# Patient Record
Sex: MALE | Race: BLACK OR AFRICAN AMERICAN | Employment: OTHER | URBAN - METROPOLITAN AREA
[De-identification: names, ages, dates, MRNs, and addresses within clinical notes are randomized per-mention and may not be internally consistent; named-entity substitution may affect disease eponyms.]

---

## 2016-11-03 LAB — PSA, EXTERNAL: 1.49

## 2017-01-12 ENCOUNTER — OP HISTORICAL/CONVERTED ENCOUNTER (OUTPATIENT)
Dept: OTHER | Age: 72
End: 2017-01-12

## 2017-01-26 ENCOUNTER — OP HISTORICAL/CONVERTED ENCOUNTER (OUTPATIENT)
Dept: OTHER | Age: 72
End: 2017-01-26

## 2017-02-02 ENCOUNTER — OP HISTORICAL/CONVERTED ENCOUNTER (OUTPATIENT)
Dept: OTHER | Age: 72
End: 2017-02-02

## 2017-02-09 ENCOUNTER — OP HISTORICAL/CONVERTED ENCOUNTER (OUTPATIENT)
Dept: OTHER | Age: 72
End: 2017-02-09

## 2017-02-16 ENCOUNTER — OP HISTORICAL/CONVERTED ENCOUNTER (OUTPATIENT)
Dept: OTHER | Age: 72
End: 2017-02-16

## 2017-02-23 ENCOUNTER — OP HISTORICAL/CONVERTED ENCOUNTER (OUTPATIENT)
Dept: OTHER | Age: 72
End: 2017-02-23

## 2017-03-02 ENCOUNTER — OP HISTORICAL/CONVERTED ENCOUNTER (OUTPATIENT)
Dept: OTHER | Age: 72
End: 2017-03-02

## 2017-03-03 ENCOUNTER — OP HISTORICAL/CONVERTED ENCOUNTER (OUTPATIENT)
Dept: OTHER | Age: 72
End: 2017-03-03

## 2017-09-05 ENCOUNTER — OP HISTORICAL/CONVERTED ENCOUNTER (OUTPATIENT)
Dept: OTHER | Age: 72
End: 2017-09-05

## 2017-12-18 ENCOUNTER — OP HISTORICAL/CONVERTED ENCOUNTER (OUTPATIENT)
Dept: OTHER | Age: 72
End: 2017-12-18

## 2017-12-18 LAB — LDL-C, EXTERNAL: 137

## 2017-12-29 ENCOUNTER — ED HISTORICAL/CONVERTED ENCOUNTER (OUTPATIENT)
Dept: OTHER | Age: 72
End: 2017-12-29

## 2018-07-11 ENCOUNTER — ED HISTORICAL/CONVERTED ENCOUNTER (OUTPATIENT)
Dept: OTHER | Age: 73
End: 2018-07-11

## 2018-10-22 ENCOUNTER — OP HISTORICAL/CONVERTED ENCOUNTER (OUTPATIENT)
Dept: OTHER | Age: 73
End: 2018-10-22

## 2018-12-21 ENCOUNTER — OP HISTORICAL/CONVERTED ENCOUNTER (OUTPATIENT)
Dept: OTHER | Age: 73
End: 2018-12-21

## 2019-01-15 ENCOUNTER — ED HISTORICAL/CONVERTED ENCOUNTER (OUTPATIENT)
Dept: OTHER | Age: 74
End: 2019-01-15

## 2019-01-15 LAB
INR, EXTERNAL: 0.9
MICROALBUMIN UR TEST STR-MCNC: 100 MG/DL
PT, EXTERNAL: 9.5

## 2019-03-14 LAB — CREATININE, EXTERNAL: 1.28

## 2019-04-21 ENCOUNTER — ED HISTORICAL/CONVERTED ENCOUNTER (OUTPATIENT)
Dept: OTHER | Age: 74
End: 2019-04-21

## 2020-07-29 RX ORDER — AMLODIPINE BESYLATE 10 MG/1
TABLET ORAL
Qty: 90 TAB | Refills: 0 | Status: SHIPPED | OUTPATIENT
Start: 2020-07-29 | End: 2021-09-24

## 2020-09-15 ENCOUNTER — TELEPHONE (OUTPATIENT)
Dept: FAMILY MEDICINE CLINIC | Age: 75
End: 2020-09-15

## 2020-09-15 DIAGNOSIS — J45.30 MILD PERSISTENT ASTHMA WITHOUT COMPLICATION: Primary | ICD-10-CM

## 2020-09-15 RX ORDER — ALBUTEROL SULFATE 90 UG/1
2 AEROSOL, METERED RESPIRATORY (INHALATION)
Qty: 1 INHALER | Refills: 0 | Status: SHIPPED | OUTPATIENT
Start: 2020-09-15 | End: 2020-10-13

## 2020-10-08 ENCOUNTER — OFFICE VISIT (OUTPATIENT)
Dept: FAMILY MEDICINE CLINIC | Age: 75
End: 2020-10-08
Payer: MEDICARE

## 2020-10-08 VITALS
HEART RATE: 78 BPM | BODY MASS INDEX: 31.57 KG/M2 | TEMPERATURE: 97.1 F | RESPIRATION RATE: 18 BRPM | WEIGHT: 246 LBS | DIASTOLIC BLOOD PRESSURE: 90 MMHG | OXYGEN SATURATION: 98 % | SYSTOLIC BLOOD PRESSURE: 140 MMHG | HEIGHT: 74 IN

## 2020-10-08 DIAGNOSIS — Z71.6 TOBACCO ABUSE COUNSELING: ICD-10-CM

## 2020-10-08 DIAGNOSIS — M10.9 ACUTE GOUTY ARTHRITIS: Primary | ICD-10-CM

## 2020-10-08 DIAGNOSIS — I11.9 MALIGNANT HYPERTENSIVE HEART DISEASE WITHOUT HEART FAILURE: ICD-10-CM

## 2020-10-08 DIAGNOSIS — E78.2 MIXED HYPERLIPIDEMIA: ICD-10-CM

## 2020-10-08 DIAGNOSIS — I73.9 PVD (PERIPHERAL VASCULAR DISEASE) (HCC): ICD-10-CM

## 2020-10-08 DIAGNOSIS — F17.200 TOBACCO DEPENDENCE SYNDROME: ICD-10-CM

## 2020-10-08 PROCEDURE — 99214 OFFICE O/P EST MOD 30 MIN: CPT | Performed by: FAMILY MEDICINE

## 2020-10-08 RX ORDER — ALLOPURINOL 300 MG/1
300 TABLET ORAL DAILY
Qty: 90 TAB | Refills: 0 | Status: SHIPPED | OUTPATIENT
Start: 2020-10-08 | End: 2021-01-06

## 2020-10-08 RX ORDER — COLCHICINE 0.6 MG/1
0.6 TABLET ORAL 2 TIMES DAILY
Qty: 30 TAB | Refills: 0 | Status: SHIPPED | OUTPATIENT
Start: 2020-10-08 | End: 2021-03-22 | Stop reason: SDUPTHER

## 2020-10-08 RX ORDER — IBUPROFEN 600 MG/1
600 TABLET ORAL
Qty: 30 TAB | Refills: 0 | Status: SHIPPED | OUTPATIENT
Start: 2020-10-08 | End: 2021-04-07 | Stop reason: ALTCHOICE

## 2020-10-08 NOTE — PROGRESS NOTES
Shannon Campbell is a 77 y.o. male and presents with Follow Up Chronic Condition; Hypertension; and Medication Refill  . HPI   Patient with a hx of HTN c/o severe right knee pain after drinking 4 shots of whiskey with his dinner     Subjective:  Cardiovascular Review:  The patient has hypertension   Diet and Lifestyle: generally follows a low fat low cholesterol diet, generally follows a low sodium diet, exercises sporadically  Home BP Monitoring: is not measured at home. Pertinent ROS: taking medications as instructed, no medication side effects noted, no TIA's, no chest pain on exertion, no dyspnea on exertion, no swelling of ankles. Review of Systems  Review of Systems   Constitutional: Negative. Negative for chills and fever. HENT: Negative. Negative for congestion, ear discharge, hearing loss, nosebleeds and tinnitus. Eyes: Negative. Negative for blurred vision, double vision, photophobia and pain. Respiratory: Negative. Negative for cough, hemoptysis and sputum production. Cardiovascular: Negative. Negative for chest pain and palpitations. Gastrointestinal: Negative. Negative for heartburn, nausea and vomiting. Genitourinary: Negative. Negative for dysuria, frequency and urgency. Musculoskeletal: Positive for joint pain. Negative for back pain and myalgias. Skin: Negative. Neurological: Negative. Negative for dizziness, tingling, weakness and headaches. Endo/Heme/Allergies: Negative. Psychiatric/Behavioral: Negative. Negative for depression and suicidal ideas. The patient does not have insomnia. All other systems reviewed and are negative.         Past Medical History:   Diagnosis Date    Diabetes [facility])     pt denies he has diabetes    Gastrointestinal disorder     COLITIS    GERD (gastroesophageal reflux disease)     Gout     Gout 2012    right foot    Hypertension     Ill-defined condition     gout    Other ill-defined conditions(597.43)     pherpherial vascular disease, foot ulcer     Past Surgical History:   Procedure Laterality Date    HX HEENT Right 2001    cataract removal    HX ORTHOPAEDIC Right 7/24/15    excision part of second toe    HX OTHER SURGICAL      Toe Nail Removed Right Foot    VASCULAR SURGERY PROCEDURE UNLIST Right 4/24/2015    RIGHT POPLITEAL-TIBIAL BYPASS - by Dr Marcelino Howell     Social History     Socioeconomic History    Marital status:      Spouse name: Not on file    Number of children: Not on file    Years of education: Not on file    Highest education level: Not on file   Tobacco Use    Smoking status: Current Every Day Smoker     Packs/day: 0.75     Years: 50.00     Pack years: 37.50    Smokeless tobacco: Never Used    Tobacco comment: pateint states he is trying to quit   Substance and Sexual Activity    Alcohol use: Yes     Alcohol/week: 5.0 standard drinks     Types: 6 Cans of beer per week     Comment: \" a few drinks a week\"    Drug use: No     No family history on file. Current Outpatient Medications   Medication Sig Dispense Refill    albuterol (PROVENTIL HFA, VENTOLIN HFA, PROAIR HFA) 90 mcg/actuation inhaler Take 2 Puffs by inhalation every six (6) hours as needed for Wheezing. 1 Inhaler 0    amLODIPine (NORVASC) 10 mg tablet Take 1 tablet by mouth once daily 90 Tab 0    oxyCODONE-acetaminophen (PERCOCET) 5-325 mg per tablet Take 1 Tab by mouth every four (4) hours as needed for Pain. Max Daily Amount: 6 Tabs. 40 Tab 0    oxyCODONE-acetaminophen (PERCOCET) 5-325 mg per tablet Take 1 Tab by mouth every four (4) hours as needed for Pain.  Omega-3 Fatty Acids 300 mg cap Take  by mouth.  colchicine (COLCRYS) 0.6 mg tablet Take 0.6 mg by mouth two (2) times a day.  ALLOPURINOL PO Take 300 mg by mouth.  ibuprofen (MOTRIN) 200 mg tablet Take  by mouth every eight (8) hours as needed for Pain.  losartan (COZAAR) 100 mg tablet Take 100 mg by mouth daily.        Allergies   Allergen Reactions    Bactrim [Sulfamethoprim Ds] Itching    Sulfa (Sulfonamide Antibiotics) Rash     Pt reports recently started medication but got a rash       Objective:  Visit Vitals  BP (!) 140/90 (BP 1 Location: Left arm, BP Patient Position: Sitting)   Pulse 78   Temp 97.1 °F (36.2 °C) (Temporal)   Resp 18   Ht 6' 2\" (1.88 m)   Wt 246 lb (111.6 kg)   SpO2 98% Comment: room air   BMI 31.58 kg/m²       Physical Exam:   Physical Exam  Vitals signs and nursing note reviewed. Constitutional:       General: He is not in acute distress. Appearance: Normal appearance. He is obese. He is not ill-appearing, toxic-appearing or diaphoretic. HENT:      Head: Normocephalic and atraumatic. Right Ear: Tympanic membrane, ear canal and external ear normal. There is no impacted cerumen. Left Ear: Tympanic membrane, ear canal and external ear normal. There is no impacted cerumen. Nose: Nose normal. No congestion or rhinorrhea. Mouth/Throat:      Mouth: Mucous membranes are moist.      Pharynx: Oropharynx is clear. No oropharyngeal exudate or posterior oropharyngeal erythema. Eyes:      General: No scleral icterus. Right eye: No discharge. Left eye: No discharge. Extraocular Movements: Extraocular movements intact. Conjunctiva/sclera: Conjunctivae normal.      Pupils: Pupils are equal, round, and reactive to light. Neck:      Musculoskeletal: Normal range of motion and neck supple. No neck rigidity or muscular tenderness. Vascular: No carotid bruit. Cardiovascular:      Rate and Rhythm: Normal rate and regular rhythm. Pulses: Normal pulses. Heart sounds: Normal heart sounds. No murmur. No friction rub. No gallop. Pulmonary:      Effort: Pulmonary effort is normal. No respiratory distress. Breath sounds: Normal breath sounds. No stridor. No wheezing, rhonchi or rales. Chest:      Chest wall: No tenderness. Abdominal:      General: Abdomen is flat.  Bowel sounds are normal. There is no distension. Palpations: Abdomen is soft. There is no mass. Tenderness: There is no abdominal tenderness. There is no right CVA tenderness, left CVA tenderness, guarding or rebound. Hernia: No hernia is present. Musculoskeletal: Normal range of motion. General: No swelling, tenderness (right medial knee), deformity or signs of injury. Right lower leg: No edema. Left lower leg: No edema. Lymphadenopathy:      Cervical: No cervical adenopathy. Skin:     General: Skin is warm. Capillary Refill: Capillary refill takes 2 to 3 seconds. Coloration: Skin is not jaundiced or pale. Findings: No bruising, erythema, lesion or rash. Neurological:      General: No focal deficit present. Mental Status: He is alert and oriented to person, place, and time. Mental status is at baseline. Cranial Nerves: No cranial nerve deficit. Sensory: No sensory deficit. Motor: No weakness. Coordination: Coordination normal.      Gait: Gait normal.      Deep Tendon Reflexes: Reflexes normal.   Psychiatric:         Mood and Affect: Mood normal.         Behavior: Behavior normal.         Thought Content:  Thought content normal.         Judgment: Judgment normal.             Results for orders placed or performed during the hospital encounter of 07/29/15   CULTURE, WOUND W GRAM STAIN    Specimen: Foot    RIGHT   Result Value Ref Range    Special Requests: NO SPECIAL REQUESTS      GRAM STAIN NO ORGANISMS SEEN      Culture result: LIGHT  MORGANELLA MORGANII           Susceptibility    (morganella morganii) - RAJAT     Amikacin ($) <=16 Susceptible ug/mL     Ampicillin/sulbactam ($) 16/8 Intermediate ug/mL     Aztreonam ($$$$) <=4 Susceptible ug/mL     Ceftazidime ($) <=1 Susceptible ug/mL     Ceftriaxone ($) <=1 Susceptible ug/mL     Cefotaxime <=2 Susceptible ug/mL     Cefepime ($$) <=4 Susceptible ug/mL     Ciprofloxacin ($) <=1 Susceptible ug/mL Gentamicin ($) <=4 Susceptible ug/mL     Levofloxacin ($) <=2 Susceptible ug/mL     Meropenem ($$) <=1 Susceptible ug/mL     Piperacillin/Tazobac ($) <=16 Susceptible ug/mL     Tobramycin ($) <=4 Susceptible ug/mL     Trimeth-Sulfamethoxa <=2/38 Susceptible ug/mL   CULTURE, ANAEROBIC    Specimen: Wound    RIGHT   Result Value Ref Range    Special Requests: NO SPECIAL REQUESTS      Culture result: NO ANAEROBES ISOLATED     METABOLIC PANEL, BASIC   Result Value Ref Range    Sodium 138 136 - 145 mmol/L    Potassium 3.9 3.5 - 5.1 mmol/L    Chloride 103 97 - 108 mmol/L    CO2 26 21 - 32 mmol/L    Anion gap 9 5 - 15 mmol/L    Glucose 120 (H) 65 - 100 mg/dL    BUN 11 6 - 20 MG/DL    Creatinine 0.88 0.45 - 1.15 MG/DL    BUN/Creatinine ratio 13 12 - 20      GFR est AA >60 >60 ml/min/1.73m2    GFR est non-AA >60 >60 ml/min/1.73m2    Calcium 9.0 8.5 - 10.1 MG/DL   CBC WITH AUTOMATED DIFF   Result Value Ref Range    WBC 9.7 4.1 - 11.1 K/uL    RBC 3.73 (L) 4.10 - 5.70 M/uL    HGB 11.8 (L) 12.1 - 17.0 g/dL    HCT 37.3 36.6 - 50.3 %    .0 (H) 80.0 - 99.0 FL    MCH 31.6 26.0 - 34.0 PG    MCHC 31.6 30.0 - 36.5 g/dL    RDW 15.1 (H) 11.5 - 14.5 %    PLATELET 806 168 - 844 K/uL    NEUTROPHILS 70 32 - 75 %    LYMPHOCYTES 15 12 - 49 %    MONOCYTES 11 5 - 13 %    EOSINOPHILS 4 0 - 7 %    BASOPHILS 0 0 - 1 %    ABS. NEUTROPHILS 6.8 1.8 - 8.0 K/UL    ABS. LYMPHOCYTES 1.5 0.8 - 3.5 K/UL    ABS. MONOCYTES 1.0 0.0 - 1.0 K/UL    ABS. EOSINOPHILS 0.4 0.0 - 0.4 K/UL    ABS.  BASOPHILS 0.0 0.0 - 0.1 K/UL   C REACTIVE PROTEIN, QT   Result Value Ref Range    C-Reactive protein 3.81 (H) <0.60 mg/dL   SED RATE (ESR)   Result Value Ref Range    Sed rate, automated 78 (H) 0 - 20 mm/hr   BUN   Result Value Ref Range    BUN 10 6 - 20 MG/DL   CREATININE   Result Value Ref Range    Creatinine 0.87 0.45 - 1.15 MG/DL    GFR est AA >60 >60 ml/min/1.73m2    GFR est non-AA >60 >60 ml/min/1.73m2   GLUCOSE, POC   Result Value Ref Range    Glucose (POC) 115 (H) 65 - 100 mg/dL    Performed by [facility]    GLUCOSE, POC   Result Value Ref Range    Glucose (POC) 108 (H) 65 - 100 mg/dL    Performed by Lorena Hernandez        Assessment/Plan:    ICD-10-CM ICD-9-CM    1. 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 Mixed hyperlipidemia  E78.2 272.2      No orders of the defined types were placed in this encounter. Cannot display discharge medications since this is not an admission.

## 2020-10-12 ENCOUNTER — TELEPHONE (OUTPATIENT)
Dept: FAMILY MEDICINE CLINIC | Age: 75
End: 2020-10-12

## 2020-10-13 ENCOUNTER — HOSPITAL ENCOUNTER (EMERGENCY)
Age: 75
Discharge: HOME | End: 2020-10-13
Attending: EMERGENCY MEDICINE
Payer: MEDICARE

## 2020-10-13 ENCOUNTER — APPOINTMENT (OUTPATIENT)
Dept: GENERAL RADIOLOGY | Age: 75
End: 2020-10-13
Attending: EMERGENCY MEDICINE
Payer: MEDICARE

## 2020-10-13 VITALS
OXYGEN SATURATION: 98 % | DIASTOLIC BLOOD PRESSURE: 94 MMHG | HEIGHT: 74 IN | SYSTOLIC BLOOD PRESSURE: 150 MMHG | WEIGHT: 245 LBS | BODY MASS INDEX: 31.44 KG/M2 | TEMPERATURE: 98.1 F | RESPIRATION RATE: 18 BRPM | HEART RATE: 99 BPM

## 2020-10-13 DIAGNOSIS — J06.9 UPPER RESPIRATORY TRACT INFECTION, UNSPECIFIED TYPE: Primary | ICD-10-CM

## 2020-10-13 DIAGNOSIS — Z20.822 SUSPECTED COVID-19 VIRUS INFECTION: ICD-10-CM

## 2020-10-13 LAB — SARS-COV-2, COV2: NORMAL

## 2020-10-13 PROCEDURE — 87635 SARS-COV-2 COVID-19 AMP PRB: CPT

## 2020-10-13 PROCEDURE — 71045 X-RAY EXAM CHEST 1 VIEW: CPT

## 2020-10-13 PROCEDURE — 99282 EMERGENCY DEPT VISIT SF MDM: CPT

## 2020-10-13 RX ORDER — ALBUTEROL SULFATE 90 UG/1
2 AEROSOL, METERED RESPIRATORY (INHALATION)
Qty: 9 INHALER | Refills: 0 | Status: SHIPPED | OUTPATIENT
Start: 2020-10-13 | End: 2020-11-19

## 2020-10-13 RX ORDER — DEXTROMETHORPHAN HYDROBROMIDE, GUAIFENESIN 20; 400 MG/20ML; MG/20ML
5 SOLUTION ORAL EVERY 6 HOURS
Qty: 200 ML | Refills: 0 | Status: SHIPPED | OUTPATIENT
Start: 2020-10-13 | End: 2021-04-07 | Stop reason: ALTCHOICE

## 2020-10-13 RX ORDER — AZITHROMYCIN 250 MG/1
TABLET, FILM COATED ORAL
Qty: 6 TAB | Refills: 0 | Status: SHIPPED | OUTPATIENT
Start: 2020-10-13 | End: 2021-04-07 | Stop reason: ALTCHOICE

## 2020-10-13 RX ORDER — ALBUTEROL SULFATE 90 UG/1
AEROSOL, METERED RESPIRATORY (INHALATION)
Qty: 9 G | Refills: 0 | Status: SHIPPED | OUTPATIENT
Start: 2020-10-13 | End: 2021-09-28 | Stop reason: SDUPTHER

## 2020-10-13 RX ORDER — ALBUTEROL SULFATE 90 UG/1
2 AEROSOL, METERED RESPIRATORY (INHALATION)
Qty: 1 INHALER | Refills: 0 | Status: SHIPPED | OUTPATIENT
Start: 2020-10-13 | End: 2021-04-07 | Stop reason: SDUPTHER

## 2020-10-13 NOTE — ED PROVIDER NOTES
EMERGENCY DEPARTMENT HISTORY AND PHYSICAL EXAM      Date: 10/13/2020  Patient Name: Femi Leo    History of Presenting Illness     Chief Complaint   Patient presents with    Cough       History Provided By: Patient    HPI: Femi Leo, 77 y.o. male with a past medical history significant hypertension and gout presents to the ED with chief complaint of Cough  . Patient is having cough congestion for the last 2 days. Initially it took his breath away but he feels better now. Shortness of breath is not related to exertion. Unsure about any fevers. Does feel body aches. No Covid exposure. No nausea vomiting diarrhea or constipation. No recent antibiotics. No headache ear pain or throat pain. There are no other complaints, changes, or physical findings at this time. PCP: Shavon Romo MD    Current Outpatient Medications   Medication Sig Dispense Refill    azithromycin (Zithromax Z-Michael) 250 mg tablet Take 2 tablet p.o. day 1 then 1 tablet p.o. day 2 through 5 6 Tab 0    albuterol (PROVENTIL HFA, VENTOLIN HFA, PROAIR HFA) 90 mcg/actuation inhaler Take 2 Puffs by inhalation every four (4) hours as needed for Wheezing. 1 Inhaler 0    dextromethorphan-guaiFENesin (Robitussin Cough-Chest Jaron DM) 5-100 mg/5 mL liqd Take 5 mL by mouth every six (6) hours. 200 mL 0    albuterol (PROVENTIL HFA, VENTOLIN HFA, PROAIR HFA) 90 mcg/actuation inhaler Take 2 Puffs by inhalation every six (6) hours as needed for Wheezing. 9 Inhaler 0    albuterol (PROVENTIL HFA, VENTOLIN HFA, PROAIR HFA) 90 mcg/actuation inhaler INHALE 2 PUFFS BY MOUTH EVERY 6 HOURS AS NEEDED FOR WHEEZING 9 g 0    allopurinoL (ZYLOPRIM) 300 mg tablet Take 1 Tab by mouth daily for 90 days. 90 Tab 0    colchicine (Colcrys) 0.6 mg tablet Take 1 Tab by mouth two (2) times a day. 30 Tab 0    ibuprofen (MOTRIN) 600 mg tablet Take 1 Tab by mouth every eight (8) hours as needed for Pain.  30 Tab 0    amLODIPine (NORVASC) 10 mg tablet Take 1 tablet by mouth once daily 90 Tab 0    oxyCODONE-acetaminophen (PERCOCET) 5-325 mg per tablet Take 1 Tab by mouth every four (4) hours as needed for Pain. Max Daily Amount: 6 Tabs. 40 Tab 0    oxyCODONE-acetaminophen (PERCOCET) 5-325 mg per tablet Take 1 Tab by mouth every four (4) hours as needed for Pain.  Omega-3 Fatty Acids 300 mg cap Take  by mouth.  ibuprofen (MOTRIN) 200 mg tablet Take  by mouth every eight (8) hours as needed for Pain.  losartan (COZAAR) 100 mg tablet Take 100 mg by mouth daily. Past History     Past Medical History:  Past Medical History:   Diagnosis Date    Diabetes ([facility] [address]     pt denies he has diabetes    Gastrointestinal disorder     COLITIS    GERD (gastroesophageal reflux disease)     Gout     Gout 2012    right foot    Hypertension     Ill-defined condition     gout    Other ill-defined conditions(799.89)     pherpherial vascular disease, foot ulcer       Past Surgical History:  Past Surgical History:   Procedure Laterality Date    HX HEENT Right 2001    cataract removal    HX ORTHOPAEDIC Right 7/24/15    excision part of second toe    HX OTHER SURGICAL      Toe Nail Removed Right Foot    VASCULAR SURGERY PROCEDURE UNLIST Right 4/24/2015    RIGHT POPLITEAL-TIBIAL BYPASS - by Dr Peder Holter       Family History:  No family history on file. Social History:  Social History     Tobacco Use    Smoking status: Current Every Day Smoker     Packs/day: 0.75     Years: 50.00     Pack years: 37.50    Smokeless tobacco: Never Used    Tobacco comment: pateint states he is trying to quit   Substance Use Topics    Alcohol use: Yes     Alcohol/week: 5.0 standard drinks     Types: 6 Cans of beer per week     Comment: \" a few drinks a week\"    Drug use: No       Allergies:   Allergies   Allergen Reactions    Bactrim [Sulfamethoprim Ds] Itching    Sulfa (Sulfonamide Antibiotics) Rash     Pt reports recently started medication but got a rash Review of Systems   Review of Systems   Constitutional: Positive for chills and fatigue. Negative for diaphoresis. HENT: Positive for congestion and sore throat. Negative for ear pain and nosebleeds. Eyes: Negative. Negative for pain, discharge and redness. Respiratory: Positive for cough and shortness of breath. Negative for chest tightness and wheezing. Cardiovascular: Negative. Negative for chest pain, palpitations and leg swelling. Gastrointestinal: Negative. Negative for abdominal pain, constipation, diarrhea, nausea and vomiting. Endocrine: Negative. Negative for cold intolerance. Genitourinary: Negative. Negative for difficulty urinating, dysuria and hematuria. Musculoskeletal: Negative. Negative for arthralgias, joint swelling and neck pain. Skin: Negative. Negative for color change, pallor, rash and wound. Allergic/Immunologic: Negative. Neurological: Negative. Negative for dizziness, syncope, weakness, light-headedness and headaches. Hematological: Negative. Does not bruise/bleed easily. Psychiatric/Behavioral: Negative. Negative for behavioral problems, confusion and suicidal ideas. All other systems reviewed and are negative. Physical Exam   Physical Exam  Vitals signs and nursing note reviewed. Constitutional:       General: He is not in acute distress. Appearance: He is normal weight. He is not ill-appearing. HENT:      Head: Normocephalic and atraumatic. Right Ear: External ear normal.      Left Ear: External ear normal.      Nose: Nose normal. No rhinorrhea. Mouth/Throat:      Mouth: Mucous membranes are moist.      Pharynx: Oropharynx is clear. Eyes:      Extraocular Movements: Extraocular movements intact. Conjunctiva/sclera: Conjunctivae normal.      Pupils: Pupils are equal, round, and reactive to light. Neck:      Musculoskeletal: Normal range of motion and neck supple.    Cardiovascular:      Rate and Rhythm: Normal rate and regular rhythm. Pulses: Normal pulses. Heart sounds: Normal heart sounds. Pulmonary:      Effort: Pulmonary effort is normal. No respiratory distress. Breath sounds: Normal breath sounds. Abdominal:      General: Abdomen is flat. Bowel sounds are normal.      Palpations: Abdomen is soft. Musculoskeletal: Normal range of motion. General: No tenderness or deformity. Skin:     General: Skin is warm and dry. Capillary Refill: Capillary refill takes less than 2 seconds. Findings: No bruising, lesion or rash. Neurological:      General: No focal deficit present. Mental Status: He is alert and oriented to person, place, and time. Mental status is at baseline. Psychiatric:         Mood and Affect: Mood normal.         Behavior: Behavior normal.         Thought Content: Thought content normal.         Judgment: Judgment normal.         Diagnostic Study Results     Labs -   No results found for this or any previous visit (from the past 12 hour(s)). Radiologic Studies -   XR CHEST SNGL V   Final Result        CT Results  (Last 48 hours)    None        CXR Results  (Last 48 hours)               10/13/20 1116  XR CHEST SNGL V Final result    Narrative:  Chest single view. A single view of the chest is obtained. Lung volumes are within normal limits. The peripheral lungs are clear. Cardiac and mediastinal structures are within   normal limits. There is no pneumothorax or pleural effusion. Medical Decision Making and ED Course   I am the first provider for this patient. I reviewed the vital signs, available nursing notes, past medical history, past surgical history, family history and social history. Vital Signs-Reviewed the patient's vital signs. Patient Vitals for the past 12 hrs:   Temp Pulse Resp BP SpO2   10/13/20 1107 98.1 °F (36.7 °C) 99 18 (!) 150/94 98 %       EKG interpretation:         Records Reviewed: Previous Hospital chart. EMS run report      ED Course:   Initial assessment performed. The patients presenting problems have been discussed, and they are in agreement with the care plan formulated and outlined with them. I have encouraged them to ask questions as they arise throughout their visit. Orders Placed This Encounter    XR CHEST SNGL V     Standing Status:   Standing     Number of Occurrences:   1     Order Specific Question:   Transport     Answer:   Stretcher [5]     Order Specific Question:   Reason for Exam     Answer:   cough    SARS-COV-2     Standing Status:   Standing     Number of Occurrences:   1     Order Specific Question:   Specimen source     Answer:   Nasopharyngeal [[id number]]     Order Specific Question:   Is this test for diagnosis or screening? Answer:   Screening     Order Specific Question:   Symptomatic for COVID-19 as defined by CDC? Answer:   No     Order Specific Question:   Hospitalized for COVID-19? Answer:   No     Order Specific Question:   Admitted to ICU for COVID-19? Answer:   No     Order Specific Question:   Employed in healthcare setting? Answer:   Unknown     Order Specific Question:   Resident in a congregate (group) care setting? Answer:   Unknown     Order Specific Question:   Previously tested for COVID-19? Answer:   Unknown    azithromycin (Zithromax Z-Michael) 250 mg tablet     Sig: Take 2 tablet p.o. day 1 then 1 tablet p.o. day 2 through 5     Dispense:  6 Tab     Refill:  0    albuterol (PROVENTIL HFA, VENTOLIN HFA, PROAIR HFA) 90 mcg/actuation inhaler     Sig: Take 2 Puffs by inhalation every four (4) hours as needed for Wheezing. Dispense:  1 Inhaler     Refill:  0    dextromethorphan-guaiFENesin (Robitussin Cough-Chest Jaron DM) 5-100 mg/5 mL liqd     Sig: Take 5 mL by mouth every six (6) hours.      Dispense:  200 mL     Refill:  0              CONSULTANTS:      Provider Notes (Medical Decision Making):   71-year-old male with cough congestion with a negative chest x-ray stable vitals including O2 sats in no respiratory distress. Plan to check for Covid. Symptomatic treatment at discharge with antibiotic. Procedures                       Disposition       Emergency Department Disposition:  Discharged      DISCHARGE PLAN:    Patient is discharged home. Discharge instructions provided. Patient is stable and improved at time of disposition. Vitals are stable. 1.   Current Discharge Medication List      CONTINUE these medications which have NOT CHANGED    Details   !! albuterol (PROVENTIL HFA, VENTOLIN HFA, PROAIR HFA) 90 mcg/actuation inhaler Take 2 Puffs by inhalation every six (6) hours as needed for Wheezing. Qty: 9 Inhaler, Refills: 0      !! albuterol (PROVENTIL HFA, VENTOLIN HFA, PROAIR HFA) 90 mcg/actuation inhaler INHALE 2 PUFFS BY MOUTH EVERY 6 HOURS AS NEEDED FOR WHEEZING  Qty: 9 g, Refills: 0      allopurinoL (ZYLOPRIM) 300 mg tablet Take 1 Tab by mouth daily for 90 days. Qty: 90 Tab, Refills: 0      colchicine (Colcrys) 0.6 mg tablet Take 1 Tab by mouth two (2) times a day. Qty: 30 Tab, Refills: 0      !! ibuprofen (MOTRIN) 600 mg tablet Take 1 Tab by mouth every eight (8) hours as needed for Pain. Qty: 30 Tab, Refills: 0      amLODIPine (NORVASC) 10 mg tablet Take 1 tablet by mouth once daily  Qty: 90 Tab, Refills: 0      !! oxyCODONE-acetaminophen (PERCOCET) 5-325 mg per tablet Take 1 Tab by mouth every four (4) hours as needed for Pain. Max Daily Amount: 6 Tabs. Qty: 40 Tab, Refills: 0      !! oxyCODONE-acetaminophen (PERCOCET) 5-325 mg per tablet Take 1 Tab by mouth every four (4) hours as needed for Pain. Omega-3 Fatty Acids 300 mg cap Take  by mouth. !! ibuprofen (MOTRIN) 200 mg tablet Take  by mouth every eight (8) hours as needed for Pain.      losartan (COZAAR) 100 mg tablet Take 100 mg by mouth daily. !! - Potential duplicate medications found. Please discuss with provider.         2.   Follow-up Information Follow up With Specialties Details Why Contact Tomy Xavier MD [facility] Schedule an appointment as soon as possible for a visit  Sharon Currie 7 [phone]          3. Return to ED if worse     Pt voiced they understand they plan and do not have questions at this time      Diagnosis     Clinical Impression:   1. Upper respiratory tract infection, unspecified type    2. Suspected COVID-19 virus infection        Attestations:    Suellen Cohen MD    Please note that this dictation was completed with DocDep, the computer voice recognition software. Quite often unanticipated grammatical, syntax, homophones, and other interpretive errors are inadvertently transcribed by the computer software. Please disregard these errors. Please excuse any errors that have escaped final proofreading. Thank you.

## 2020-10-13 NOTE — DISCHARGE INSTRUCTIONS
Patient Education        Learning About Coronavirus [phone])  Coronavirus [phone]): Overview  What is coronavirus ([id number])? The coronavirus disease (COVID-19) is caused by a virus. It is an illness that was first found in December 2019. It has since spread worldwide. The virus can cause fever, cough, and trouble breathing. In severe cases, it can cause pneumonia and make it hard to breathe without help. It can cause death. This virus spreads person-to-person through droplets from coughing and sneezing. It can also spread when you are close to someone who is infected. And it can spread when you touch something that has the virus on it, such as a doorknob or a tabletop. Coronaviruses are a large group of viruses. They cause the common cold. They also cause more serious illnesses like [place] respiratory syndrome (MERS) and severe acute respiratory syndrome (SARS). COVID-19 is caused by a novel coronavirus. That means it's a new type that has not been seen in people before. How is COVID-19 treated? Mild illness can be treated at home, but more serious illness needs to be treated in the hospital. Treatment may include medicines to reduce symptoms, plus breathing support such as oxygen therapy or a ventilator. Other treatments, such as antiviral medicines, may help people who have COVID-19. What can you do to protect yourself from COVID-19? The best way to protect yourself from getting sick is to:  · Avoid areas where there is an outbreak. · Avoid contact with people who may be infected. · Avoid crowds and try to stay at least 6 feet away from other people. · Wash your hands often, especially after you cough or sneeze. Use soap and water, and scrub for at least 20 seconds. If soap and water aren't available, use an alcohol-based hand . · Avoid touching your mouth, nose, and eyes. What can you do to avoid spreading the virus to others?   To help avoid spreading the virus to others:  · [employer] your hands often with soap or alcohol-based hand sanitizers. · Cover your mouth with a tissue when you cough or sneeze. Then throw the tissue in the trash. · Use a disinfectant to clean things that you touch often. These include doorknobs, remote controls, phones, and handles on your refrigerator and microwave. And don't forget countertops, tabletops, bathrooms, and computer keyboards. · Wear a cloth face cover if you have to go to public areas. If you know or suspect that you have COVID-19:  · Stay home. Don't go to school, work, or public areas. And don't use public transportation, ride-shares, or taxis unless you have no choice. · Leave your home only if you need to get medical care or testing. But call the doctor's office first so they know you're coming. And wear a face cover. · Limit contact with people in your home. If possible, stay in a separate bedroom and use a separate bathroom. · Wear a face cover whenever you're around other people. It can help stop the spread of the virus when you cough or sneeze. · Clean and disinfect your home every day. Use household  and disinfectant wipes or sprays. Take special care to clean things that you grab with your hands. · Self-isolate until it's safe to be around others again. ? If you have symptoms, it's safe when you haven't had a fever for 3 days and your symptoms have improved and it's been at least 10 days since your symptoms started. ? If you were exposed to the virus but don't have symptoms, it's safe to be around others 14 days after exposure. ? Talk to your doctor about whether you also need testing, especially if you have a weakened immune system. When to call for help  Call 911 anytime you think you may need emergency care. For example, call if:  · You have severe trouble breathing. (You can't talk at all.)  · You have constant chest pain or pressure. · You are severely dizzy or lightheaded.   · You are confused or can't think clearly. · Your face and lips have a blue color. · You passed out (lost consciousness) or are very hard to wake up. Call your doctor now if you develop symptoms such as:  · Shortness of breath. · Fever. · Cough. If you need to get care, call ahead to the doctor's office for instructions before you go. Make sure you wear a face cover to prevent exposing other people to the virus. Where can you get the latest information? The following health organizations are tracking and studying this virus. Their websites contain the most up-to-date information. Seema Gibson also learn what to do if you think you may have been exposed to the virus. · U.S. Centers for Disease Control and Prevention (CDC): The CDC provides updated news about the disease and travel advice. The website also tells you how to prevent the spread of infection. www.cdc.gov  · World Health Organization [facility]): WHO offers information about the virus outbreaks. WHO also has travel advice. www.who.int  Current as of: July 10, 2020               Content Version: 12.6  © 2006-2020 [employer]. Care instructions adapted under license by [employer] (which disclaims liability or warranty for this information). If you have questions about a medical condition or this instruction, always ask your healthcare professional. [address] any warranty or liability for your use of this information.

## 2020-10-15 ENCOUNTER — TELEPHONE (OUTPATIENT)
Dept: FAMILY MEDICINE CLINIC | Age: 75
End: 2020-10-15

## 2020-10-15 LAB — SARS-COV-2, COV2NT: NOT DETECTED

## 2020-10-15 NOTE — TELEPHONE ENCOUNTER
Patient called and said one of the medications prescribed to him from the hospital has a reaction with one the the medications you currently prescribe to him he wants to know if you have reviewed his er visit and if the medications can be taken at different times or not at all

## 2020-10-15 NOTE — PROGRESS NOTES
Patient called requesting COVID results, he was notified of negative test result, he verbalizes understanding

## 2020-10-21 ENCOUNTER — OFFICE VISIT (OUTPATIENT)
Dept: FAMILY MEDICINE CLINIC | Age: 75
End: 2020-10-21
Payer: MEDICARE

## 2020-10-21 VITALS
SYSTOLIC BLOOD PRESSURE: 130 MMHG | TEMPERATURE: 97.3 F | HEART RATE: 76 BPM | BODY MASS INDEX: 31.84 KG/M2 | RESPIRATION RATE: 18 BRPM | WEIGHT: 248 LBS | OXYGEN SATURATION: 98 % | DIASTOLIC BLOOD PRESSURE: 70 MMHG

## 2020-10-21 DIAGNOSIS — J45.40 MODERATE PERSISTENT ASTHMA WITHOUT COMPLICATION: ICD-10-CM

## 2020-10-21 DIAGNOSIS — R05.9 COUGH: Primary | ICD-10-CM

## 2020-10-21 PROCEDURE — 99213 OFFICE O/P EST LOW 20 MIN: CPT | Performed by: FAMILY MEDICINE

## 2020-10-21 NOTE — PROGRESS NOTES
HPI    Andreina Sauceda is a 77 y.o. male and presents today for Hospital Follow Up (cough)  . HPI   78 yo male with a hx of asthma and COPD and currently smoking a pack of cigarettes a day, s/p hospital ER visit for coughing with normal chest xray  Allergies    Allergies   Allergen Reactions    Bactrim [Sulfamethoprim Ds] Itching    Sulfa (Sulfonamide Antibiotics) Rash     Pt reports recently started medication but got a rash        Medications    Current Outpatient Medications   Medication Sig Dispense    albuterol (PROVENTIL HFA, VENTOLIN HFA, PROAIR HFA) 90 mcg/actuation inhaler Take 2 Puffs by inhalation every six (6) hours as needed for Wheezing. 9 Inhaler    albuterol (PROVENTIL HFA, VENTOLIN HFA, PROAIR HFA) 90 mcg/actuation inhaler INHALE 2 PUFFS BY MOUTH EVERY 6 HOURS AS NEEDED FOR WHEEZING 9 g    azithromycin (Zithromax Z-Michael) 250 mg tablet Take 2 tablet p.o. day 1 then 1 tablet p.o. day 2 through 5 6 Tab    albuterol (PROVENTIL HFA, VENTOLIN HFA, PROAIR HFA) 90 mcg/actuation inhaler Take 2 Puffs by inhalation every four (4) hours as needed for Wheezing. 1 Inhaler    dextromethorphan-guaiFENesin (Robitussin Cough-Chest Jaron DM) 5-100 mg/5 mL liqd Take 5 mL by mouth every six (6) hours. 200 mL    allopurinoL (ZYLOPRIM) 300 mg tablet Take 1 Tab by mouth daily for 90 days. 90 Tab    colchicine (Colcrys) 0.6 mg tablet Take 1 Tab by mouth two (2) times a day. 30 Tab    ibuprofen (MOTRIN) 600 mg tablet Take 1 Tab by mouth every eight (8) hours as needed for Pain. 30 Tab    amLODIPine (NORVASC) 10 mg tablet Take 1 tablet by mouth once daily 90 Tab    oxyCODONE-acetaminophen (PERCOCET) 5-325 mg per tablet Take 1 Tab by mouth every four (4) hours as needed for Pain. Max Daily Amount: 6 Tabs. 40 Tab    oxyCODONE-acetaminophen (PERCOCET) 5-325 mg per tablet Take 1 Tab by mouth every four (4) hours as needed for Pain.  Omega-3 Fatty Acids 300 mg cap Take  by mouth.      ibuprofen (MOTRIN) 200 mg tablet Take  by mouth every eight (8) hours as needed for Pain.  losartan (COZAAR) 100 mg tablet Take 100 mg by mouth daily. No current facility-administered medications for this visit. Health Maintenance    Health Maintenance Due   Topic Date Due    Hepatitis C Screening  1945    DTaP/Tdap/Td series (1 - Tdap) 08/19/1967    Lipid Screen  08/19/1986    Shingrix Vaccine Age 50> (1 of 2) 08/19/1996    Colorectal Cancer Screening Combo  08/19/1996    GLAUCOMA SCREENING Q2Y  08/19/2011    AAA Screening 73-67 YO Male Smoking Patients  08/19/2011    Pneumococcal 65+ years (1 of 1 - PPSV23) 08/19/2011    Medicare Yearly Exam  02/26/2020    Flu Vaccine (1) 09/01/2020        Problem List    Patient Active Problem List    Diagnosis Date Noted    Acute gouty arthritis 10/08/2020    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 Social Hx    Social History     Socioeconomic History    Marital status:      Spouse name: Not on file    Number of children: Not on file    Years of education: Not on file    Highest education level: Not on file   Tobacco Use    Smoking status: Current Every Day Smoker     Packs/day: 0.75     Years: 50.00     Pack years: 45.53    Smokeless tobacco: Never Used    Tobacco comment: pateint states he is trying to quit   Substance and Sexual Activity    Alcohol use:  Yes     Alcohol/week: 5.0 standard drinks     Types: 6 Cans of beer per week     Comment: \" a few drinks a week\"    Drug use: No        Surgical Hx    Past Surgical History:   Procedure Laterality Date    HX HEENT Right 2001    cataract removal    HX ORTHOPAEDIC Right 7/24/15    excision part of second toe    HX OTHER SURGICAL      Toe Nail Removed Right Foot    VASCULAR SURGERY PROCEDURE UNLIST Right 4/24/2015    RIGHT POPLITEAL-TIBIAL BYPASS - by Dr Abhay Bronson  /70 (BP 1 Location: Left arm, BP Patient Position: Sitting)   Pulse 76   Temp 97.3 °F (36.3 °C) (Temporal)   Resp 18   Wt 248 lb (112.5 kg)   SpO2 98% Comment: room air   BMI 31.84 kg/m²        ROS    Review of Systems   Constitutional: Negative. Negative for chills and fever. HENT: Negative. Negative for congestion, ear discharge, hearing loss, nosebleeds and tinnitus. Eyes: Negative. Negative for blurred vision, double vision, photophobia and pain. Respiratory: Positive for cough. Negative for hemoptysis and sputum production. Cardiovascular: Negative. Negative for chest pain and palpitations. Gastrointestinal: Negative. Negative for heartburn, nausea and vomiting. Genitourinary: Negative. Negative for dysuria, frequency and urgency. Musculoskeletal: Negative. Negative for back pain and myalgias. Skin: Negative. Neurological: Negative. Negative for dizziness, tingling, weakness and headaches. Endo/Heme/Allergies: Negative. Psychiatric/Behavioral: Negative. Negative for depression and suicidal ideas. The patient does not have insomnia. All other systems reviewed and are negative. Physical Exam      Physical Exam  Vitals signs and nursing note reviewed. Constitutional:       General: He is not in acute distress. Appearance: Normal appearance. He is obese. He is not ill-appearing, toxic-appearing or diaphoretic. HENT:      Head: Normocephalic and atraumatic. Right Ear: Tympanic membrane, ear canal and external ear normal. There is no impacted cerumen. Left Ear: Tympanic membrane, ear canal and external ear normal. There is no impacted cerumen. Nose: Nose normal. No congestion or rhinorrhea. Mouth/Throat:      Mouth: Mucous membranes are moist.      Pharynx: Oropharynx is clear.  No oropharyngeal exudate or posterior oropharyngeal erythema. Eyes:      General: No scleral icterus. Right eye: No discharge. Left eye: No discharge. Extraocular Movements: Extraocular movements intact. Conjunctiva/sclera: Conjunctivae normal.      Pupils: Pupils are equal, round, and reactive to light. Neck:      Musculoskeletal: Normal range of motion and neck supple. No neck rigidity or muscular tenderness. Vascular: No carotid bruit. Cardiovascular:      Rate and Rhythm: Normal rate and regular rhythm. Pulses: Normal pulses. Heart sounds: Normal heart sounds. No murmur. No friction rub. No gallop. Pulmonary:      Effort: Pulmonary effort is normal. No respiratory distress. Breath sounds: Normal breath sounds. No stridor. No wheezing, rhonchi or rales. Chest:      Chest wall: No tenderness. Abdominal:      General: Abdomen is flat. Bowel sounds are normal. There is no distension. Palpations: Abdomen is soft. There is no mass. Tenderness: There is no abdominal tenderness. There is no right CVA tenderness, left CVA tenderness, guarding or rebound. Hernia: No hernia is present. Musculoskeletal: Normal range of motion. General: No swelling, tenderness, deformity or signs of injury. Right lower leg: No edema. Left lower leg: No edema. Lymphadenopathy:      Cervical: No cervical adenopathy. Skin:     General: Skin is warm. Capillary Refill: Capillary refill takes 2 to 3 seconds. Coloration: Skin is not jaundiced or pale. Findings: No bruising, erythema, lesion or rash. Neurological:      General: No focal deficit present. Mental Status: He is alert and oriented to person, place, and time. Mental status is at baseline. Cranial Nerves: No cranial nerve deficit. Sensory: No sensory deficit. Motor: No weakness.       Coordination: Coordination normal.      Gait: Gait normal.      Deep Tendon Reflexes: Reflexes normal.   Psychiatric:         Mood and Affect: Mood normal.         Behavior: Behavior normal.         Thought Content: Thought content normal.         Judgment: Judgment normal.          Assessment/Plan         Health Maintenance Items reviewed with patient as noted.

## 2020-11-05 VITALS
HEART RATE: 64 BPM | WEIGHT: 249 LBS | HEIGHT: 72 IN | BODY MASS INDEX: 33.72 KG/M2 | OXYGEN SATURATION: 97 % | TEMPERATURE: 98.4 F | DIASTOLIC BLOOD PRESSURE: 76 MMHG | SYSTOLIC BLOOD PRESSURE: 132 MMHG | RESPIRATION RATE: 18 BRPM

## 2020-11-05 DIAGNOSIS — Z23 ENCOUNTER FOR IMMUNIZATION: ICD-10-CM

## 2020-11-16 ENCOUNTER — TELEPHONE (OUTPATIENT)
Dept: FAMILY MEDICINE CLINIC | Age: 75
End: 2020-11-16

## 2020-11-16 NOTE — TELEPHONE ENCOUNTER
Patient called and said he took the x ray you ordered and he is still coughing coughed the whole weekend and has not been able to sleep said you told him he did not need an antibotic after his er f/u I told him he will need to come in he said you already know why he is coughing wants you to send in the antibotic the hospital had ordered for him?????

## 2020-11-19 RX ORDER — ALBUTEROL SULFATE 90 UG/1
AEROSOL, METERED RESPIRATORY (INHALATION)
Qty: 9 G | Refills: 0 | Status: SHIPPED | OUTPATIENT
Start: 2020-11-19 | End: 2021-04-07 | Stop reason: SDUPTHER

## 2020-12-03 ENCOUNTER — OFFICE VISIT (OUTPATIENT)
Dept: FAMILY MEDICINE CLINIC | Age: 75
End: 2020-12-03
Payer: MEDICARE

## 2020-12-03 ENCOUNTER — TELEPHONE (OUTPATIENT)
Dept: FAMILY MEDICINE CLINIC | Age: 75
End: 2020-12-03

## 2020-12-03 VITALS
DIASTOLIC BLOOD PRESSURE: 90 MMHG | BODY MASS INDEX: 29.92 KG/M2 | RESPIRATION RATE: 20 BRPM | WEIGHT: 233 LBS | HEART RATE: 100 BPM | TEMPERATURE: 98.7 F | SYSTOLIC BLOOD PRESSURE: 150 MMHG | OXYGEN SATURATION: 96 %

## 2020-12-03 DIAGNOSIS — Z91.14 NONCOMPLIANCE WITH MEDICATIONS: ICD-10-CM

## 2020-12-03 DIAGNOSIS — R05.3 COUGH, PERSISTENT: Primary | ICD-10-CM

## 2020-12-03 DIAGNOSIS — J45.40 MODERATE PERSISTENT ASTHMA WITHOUT COMPLICATION: ICD-10-CM

## 2020-12-03 PROCEDURE — 99213 OFFICE O/P EST LOW 20 MIN: CPT | Performed by: FAMILY MEDICINE

## 2020-12-03 RX ORDER — BENZONATATE 200 MG/1
200 CAPSULE ORAL
Qty: 30 CAP | Refills: 0 | Status: SHIPPED | OUTPATIENT
Start: 2020-12-03 | End: 2020-12-13

## 2020-12-03 NOTE — PROGRESS NOTES
HPI    Annie Conner is a 77 y.o. male and presents today for Follow-up; Wound Check; and Cough  . HPI   76 yo male with a hx of HTN and a foot wound for which he has a Podiatrist appointment c/o persistent cough and wheezing but admits to not using his bronchodilator inhaler,smoking less than a half pack of cigs a day  and also c/o a mold in some areas of his house that his children have promised to fix  Allergies    Allergies   Allergen Reactions    Sulfa (Sulfonamide Antibiotics) Hives and Rash     Pt reports recently started medication but got a rash    Bactrim [Sulfamethoprim Ds] Hives and Itching    Sulfur Dioxide Unknown (comments)        Medications    Current Outpatient Medications   Medication Sig Dispense    albuterol (PROVENTIL HFA, VENTOLIN HFA, PROAIR HFA) 90 mcg/actuation inhaler INHALE 2 PUFFS BY MOUTH EVERY 6 HOURS AS NEEDED FOR WHEEZING 9 g    albuterol (PROVENTIL HFA, VENTOLIN HFA, PROAIR HFA) 90 mcg/actuation inhaler INHALE 2 PUFFS BY MOUTH EVERY 6 HOURS AS NEEDED FOR WHEEZING 9 g    azithromycin (Zithromax Z-Michael) 250 mg tablet Take 2 tablet p.o. day 1 then 1 tablet p.o. day 2 through 5 6 Tab    albuterol (PROVENTIL HFA, VENTOLIN HFA, PROAIR HFA) 90 mcg/actuation inhaler Take 2 Puffs by inhalation every four (4) hours as needed for Wheezing. 1 Inhaler    dextromethorphan-guaiFENesin (Robitussin Cough-Chest Jaron DM) 5-100 mg/5 mL liqd Take 5 mL by mouth every six (6) hours. 200 mL    allopurinoL (ZYLOPRIM) 300 mg tablet Take 1 Tab by mouth daily for 90 days. 90 Tab    colchicine (Colcrys) 0.6 mg tablet Take 1 Tab by mouth two (2) times a day. 30 Tab    ibuprofen (MOTRIN) 600 mg tablet Take 1 Tab by mouth every eight (8) hours as needed for Pain. 30 Tab    amLODIPine (NORVASC) 10 mg tablet Take 1 tablet by mouth once daily 90 Tab    oxyCODONE-acetaminophen (PERCOCET) 5-325 mg per tablet Take 1 Tab by mouth every four (4) hours as needed for Pain. Max Daily Amount: 6 Tabs.  40 Tab  oxyCODONE-acetaminophen (PERCOCET) 5-325 mg per tablet Take 1 Tab by mouth every four (4) hours as needed for Pain.  Omega-3 Fatty Acids 300 mg cap Take  by mouth.  ibuprofen (MOTRIN) 200 mg tablet Take  by mouth every eight (8) hours as needed for Pain.  losartan (COZAAR) 100 mg tablet Take 100 mg by mouth daily. No current facility-administered medications for this visit. Health Maintenance    Health Maintenance Due   Topic Date Due    Hepatitis C Screening  1945    DTaP/Tdap/Td series (1 - Tdap) 08/19/1967    Shingrix Vaccine Age 50> (1 of 2) 08/19/1996    Colorectal Cancer Screening Combo  08/19/1996    GLAUCOMA SCREENING Q2Y  08/19/2011    AAA Screening 73-67 YO Male Smoking Patients  08/19/2011    Pneumococcal 65+ years (1 of 1 - PPSV23) 08/19/2011    Medicare Yearly Exam  02/26/2020    Flu Vaccine (1) 09/01/2020        Problem List    Patient Active Problem List    Diagnosis Date Noted    Encounter for immunization 11/05/2020    Cough 10/21/2020    Moderate persistent asthma without complication 13/60/8831    Acute gouty arthritis 10/08/2020    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 Social Hx    Social History     Socioeconomic History    Marital status:      Spouse name: Not on file    Number of children: Not on file    Years of education: Not on file    Highest education level: Not on file   Tobacco Use    Smoking status: Current Every Day Smoker     Packs/day: 0.50     Years: 55.00     Pack years: 27.50    Smokeless tobacco: Never Used    Tobacco comment: pateint states he is trying to quit   Substance and Sexual Activity    Alcohol use: Yes     Comment: occasional    Drug use:  No Surgical Hx    Past Surgical History:   Procedure Laterality Date    HX HEENT Right 2001    cataract removal    HX ORTHOPAEDIC Right 7/24/15    excision part of second toe    VASCULAR SURGERY PROCEDURE UNLIST Right 4/24/2015    RIGHT POPLITEAL-TIBIAL BYPASS - by Dr Law Zheng  BP (!) 150/90 (BP 1 Location: Left arm, BP Patient Position: Sitting)   Pulse 100   Temp 98.7 °F (37.1 °C) (Temporal)   Resp 20   Wt 233 lb (105.7 kg)   SpO2 96% Comment: room air   BMI 29.92 kg/m²        ROS    Review of Systems   Constitutional: Negative. Negative for chills and fever. HENT: Negative. Negative for congestion, ear discharge, hearing loss, nosebleeds and tinnitus. Eyes: Negative. Negative for blurred vision, double vision, photophobia and pain. Respiratory: Positive for cough and wheezing. Negative for hemoptysis and sputum production. Cardiovascular: Negative. Negative for chest pain and palpitations. Gastrointestinal: Negative. Negative for heartburn, nausea and vomiting. Genitourinary: Negative. Negative for dysuria, frequency and urgency. Musculoskeletal: Negative. Negative for back pain and myalgias. Skin: Negative. Neurological: Negative. Negative for dizziness, tingling, weakness and headaches. Endo/Heme/Allergies: Negative. Psychiatric/Behavioral: Negative. Negative for depression and suicidal ideas. The patient does not have insomnia. All other systems reviewed and are negative. Physical Exam      Physical Exam  Vitals signs and nursing note reviewed. Constitutional:       General: He is not in acute distress. Appearance: Normal appearance. He is obese. He is not ill-appearing, toxic-appearing or diaphoretic. HENT:      Head: Normocephalic and atraumatic. Right Ear: Tympanic membrane, ear canal and external ear normal. There is no impacted cerumen.       Left Ear: Tympanic membrane, ear canal and external ear normal. There is no impacted cerumen. Nose: Nose normal. No congestion or rhinorrhea. Mouth/Throat:      Mouth: Mucous membranes are moist.      Pharynx: Oropharynx is clear. No oropharyngeal exudate or posterior oropharyngeal erythema. Eyes:      General: No scleral icterus. Right eye: No discharge. Left eye: No discharge. Extraocular Movements: Extraocular movements intact. Conjunctiva/sclera: Conjunctivae normal.      Pupils: Pupils are equal, round, and reactive to light. Neck:      Musculoskeletal: Normal range of motion and neck supple. No neck rigidity or muscular tenderness. Vascular: No carotid bruit. Cardiovascular:      Rate and Rhythm: Normal rate and regular rhythm. Pulses: Normal pulses. Heart sounds: Normal heart sounds. No murmur. No friction rub. No gallop. Pulmonary:      Effort: Pulmonary effort is normal. No respiratory distress. Breath sounds: No stridor. Wheezing present. No rhonchi or rales. Chest:      Chest wall: No tenderness. Abdominal:      General: Abdomen is flat. Bowel sounds are normal. There is no distension. Palpations: Abdomen is soft. There is no mass. Tenderness: There is no abdominal tenderness. There is no right CVA tenderness, left CVA tenderness, guarding or rebound. Hernia: No hernia is present. Musculoskeletal: Normal range of motion. General: No swelling, tenderness, deformity or signs of injury. Right lower leg: No edema. Left lower leg: No edema. Lymphadenopathy:      Cervical: No cervical adenopathy. Skin:     General: Skin is warm. Capillary Refill: Capillary refill takes 2 to 3 seconds. Coloration: Skin is not jaundiced or pale. Findings: No bruising, erythema, lesion or rash. Neurological:      General: No focal deficit present. Mental Status: He is alert and oriented to person, place, and time. Mental status is at baseline.       Cranial Nerves: No cranial nerve deficit. Sensory: No sensory deficit. Motor: No weakness. Coordination: Coordination normal.      Gait: Gait normal.      Deep Tendon Reflexes: Reflexes normal.   Psychiatric:         Mood and Affect: Mood normal.         Behavior: Behavior normal.         Thought Content: Thought content normal.         Judgment: Judgment normal.          Assessment/Plan    Diagnoses and all orders for this visit:    1. Splinter of right foot without major open wound or infection, initial encounter    2. PVD (peripheral vascular disease) [facility])         Health Maintenance Items reviewed with patient as noted.

## 2020-12-08 ENCOUNTER — OFFICE VISIT (OUTPATIENT)
Dept: PODIATRY | Age: 75
End: 2020-12-08
Payer: MEDICARE

## 2020-12-08 VITALS
DIASTOLIC BLOOD PRESSURE: 89 MMHG | SYSTOLIC BLOOD PRESSURE: 146 MMHG | HEIGHT: 74 IN | WEIGHT: 243.8 LBS | HEART RATE: 90 BPM | OXYGEN SATURATION: 98 % | TEMPERATURE: 96.4 F | BODY MASS INDEX: 31.29 KG/M2

## 2020-12-08 DIAGNOSIS — S98.132A AMPUTATION OF FIFTH TOE OF LEFT FOOT (HCC): ICD-10-CM

## 2020-12-08 DIAGNOSIS — L85.3 XEROSIS CUTIS: ICD-10-CM

## 2020-12-08 DIAGNOSIS — Z89.421 HISTORY OF AMPUTATION OF LESSER TOE, RIGHT (HCC): ICD-10-CM

## 2020-12-08 DIAGNOSIS — M10.9 ACUTE GOUTY ARTHRITIS: ICD-10-CM

## 2020-12-08 DIAGNOSIS — L85.9 HYPERKERATOSIS: ICD-10-CM

## 2020-12-08 DIAGNOSIS — I73.9 PVD (PERIPHERAL VASCULAR DISEASE) (HCC): Primary | ICD-10-CM

## 2020-12-08 DIAGNOSIS — B35.1 ONYCHOMYCOSIS: ICD-10-CM

## 2020-12-08 PROCEDURE — 11721 DEBRIDE NAIL 6 OR MORE: CPT | Performed by: PODIATRIST

## 2020-12-08 PROCEDURE — 11755 BIOPSY NAIL UNIT: CPT | Performed by: PODIATRIST

## 2020-12-08 PROCEDURE — 11056 PARNG/CUTG B9 HYPRKR LES 2-4: CPT | Performed by: PODIATRIST

## 2020-12-08 PROCEDURE — 99204 OFFICE O/P NEW MOD 45 MIN: CPT | Performed by: PODIATRIST

## 2020-12-08 RX ORDER — AMMONIUM LACTATE 12 G/100G
CREAM TOPICAL 2 TIMES DAILY
Qty: 280 G | Refills: 3 | Status: SHIPPED | OUTPATIENT
Start: 2020-12-08

## 2020-12-08 NOTE — PROGRESS NOTES
[facility]    Subjective:         Patient is a 77 y.o. male who is being seen as a new pt for multiple lower extremity complaints. Patient states he has pain in both of his feet, rising to the level of 8 out of 10. He states the pain is sharp in nature and exacerbated with weightbearing. He denies any traumatic events. He states he has had both of his little toes removed due to complications with his peripheral vascular disease. He states his toenails are thick/discolored but denies ever having testing performed to confirm a diagnosis. He states he has multiple thick calluses on the plantar aspect of both feet. He states he has severe dry skin and over-the-counter lotions have not helped with the symptoms. Lastly, he states he has trouble walking due to his multiple digital amputations and would like to discuss the possibility of custom inserts. He denies any systemic signs infection. He denies any other pedal complaints    Past Medical History:   Diagnosis Date    Allergies     Colitis     Diabetes ([facility] [address]     pt denies he has diabetes    Eye problems     GERD (gastroesophageal reflux disease)     Gout 2012    right foot    History of osteomyelitis     Hx of long term use of blood thinners     plavix    Hypercholesterolemia     Hypertension     Other ill-defined conditions(799.89)     pherpherial vascular disease, foot ulcer     Past Surgical History:   Procedure Laterality Date    HX HEENT Right 2001    cataract removal    HX ORTHOPAEDIC Right 7/24/15    excision part of second toe    VASCULAR SURGERY PROCEDURE UNLIST Right 4/24/2015    RIGHT POPLITEAL-TIBIAL BYPASS - by Dr Becky Michaels       No family history on file.    Social History     Tobacco Use    Smoking status: Current Every Day Smoker     Packs/day: 0.50     Years: 55.00     Pack years: 27.50    Smokeless tobacco: Never Used    Tobacco comment: pateint states he is trying to quit   Substance Use Topics    Alcohol use: Yes     Comment: occasional     Allergies   Allergen Reactions    Sulfa (Sulfonamide Antibiotics) Hives and Rash     Pt reports recently started medication but got a rash    Bactrim [Sulfamethoprim Ds] Hives and Itching    Sulfur Dioxide Unknown (comments)     Prior to Admission medications    Medication Sig Start Date End Date Taking? Authorizing Provider   ammonium lactate (AMLACTIN) 12 % topical cream Apply  to affected area two (2) times a day. rub in to affected area well 12/8/20  Yes Hayder Fox DPM   albuterol (PROVENTIL HFA, VENTOLIN HFA, PROAIR HFA) 90 mcg/actuation inhaler INHALE 2 PUFFS BY MOUTH EVERY 6 HOURS AS NEEDED FOR WHEEZING 11/19/20   Cierra Palacio MD   albuterol (PROVENTIL HFA, VENTOLIN HFA, PROAIR HFA) 90 mcg/actuation inhaler INHALE 2 PUFFS BY MOUTH EVERY 6 HOURS AS NEEDED FOR WHEEZING 10/13/20   Cierra Palacio MD   azithromycin (Zithromax Z-Michael) 250 mg tablet Take 2 tablet p.o. day 1 then 1 tablet p.o. day 2 through 5 10/13/20   Destiny Ashton MD   albuterol (PROVENTIL HFA, VENTOLIN HFA, PROAIR HFA) 90 mcg/actuation inhaler Take 2 Puffs by inhalation every four (4) hours as needed for Wheezing. 10/13/20   Sofi Harvey MD   dextromethorphan-guaiFENesin (Robitussin Cough-Chest Jaron DM) 5-100 mg/5 mL liqd Take 5 mL by mouth every six (6) hours. 10/13/20   Destiny Ashton MD   allopurinoL (ZYLOPRIM) 300 mg tablet Take 1 Tab by mouth daily for 90 days. 10/8/20 1/6/21  Cierra Palacio MD   colchicine (Colcrys) 0.6 mg tablet Take 1 Tab by mouth two (2) times a day. 10/8/20   Cierra Palacio MD   ibuprofen (MOTRIN) 600 mg tablet Take 1 Tab by mouth every eight (8) hours as needed for Pain. 10/8/20   Cierra Palacio MD   amLODIPine (NORVASC) 10 mg tablet Take 1 tablet by mouth once daily 7/29/20   Cierra Palacio MD   oxyCODONE-acetaminophen (PERCOCET) 5-325 mg per tablet Take 1 Tab by mouth every four (4) hours as needed for Pain.  Max Daily Amount: 6 Tabs. 7/31/15   Maria A Leavitt MD   oxyCODONE-acetaminophen (PERCOCET) 5-325 mg per tablet Take 1 Tab by mouth every four (4) hours as needed for Pain. Provider, Historical   Omega-3 Fatty Acids 300 mg cap Take  by mouth. Provider, Historical   ibuprofen (MOTRIN) 200 mg tablet Take  by mouth every eight (8) hours as needed for Pain. Provider, Historical   losartan (COZAAR) 100 mg tablet Take 100 mg by mouth daily. Provider, Historical       Review of Systems   Constitutional: Negative. HENT: Negative. Eyes: Negative. Respiratory: Negative. Cardiovascular: Negative. Gastrointestinal: Negative. Endocrine: Negative. Genitourinary: Negative. Musculoskeletal: Positive for arthralgias. Allergic/Immunologic: Positive for immunocompromised state. Hematological: Negative. Psychiatric/Behavioral: Negative. All other systems reviewed and are negative. Objective:     Visit Vitals  BP (!) 146/89 (BP 1 Location: Right arm, BP Patient Position: Sitting)   Pulse 90   Temp (!) 96.4 °F (35.8 °C) (Temporal)   Ht 6' 2\" (1.88 m)   Wt 243 lb 12.8 oz (110.6 kg)   SpO2 98%   BMI 31.30 kg/m²       Physical Exam  Vitals signs reviewed. Constitutional:       Appearance: He is obese. Cardiovascular:      Pulses:           Dorsalis pedis pulses are 1+ on the right side and 1+ on the left side. Posterior tibial pulses are 1+ on the right side and 1+ on the left side. Pulmonary:      Effort: Pulmonary effort is normal.   Musculoskeletal:      Right lower leg: No edema. Left lower leg: No edema. Right foot: Decreased range of motion. Deformity present. No bunion or Charcot foot. Left foot: Decreased range of motion. Deformity present. No bunion or Charcot foot. Feet:      Right foot:      Protective Sensation: 10 sites tested. 10 sites sensed. Skin integrity: Dry skin present. Toenail Condition: Right toenails are abnormally thick.  Fungal disease present. Left foot:      Protective Sensation: 10 sites tested. 10 sites sensed. Skin integrity: Dry skin present. Toenail Condition: Left toenails are abnormally thick. Fungal disease present. Lymphadenopathy:      Lower Body: No right inguinal adenopathy. No left inguinal adenopathy. Skin:     General: Skin is warm. Capillary Refill: Capillary refill takes 2 to 3 seconds. Neurological:      Mental Status: He is alert and oriented to person, place, and time. Psychiatric:         Mood and Affect: Mood and affect normal.         Behavior: Behavior is cooperative. Data Review: No results found for this or any previous visit (from the past 24 hour(s)). Impression:       ICD-10-CM ICD-9-CM    1. PVD (peripheral vascular disease) ([facility])  I73.9 443.9    2. Amputation of fifth toe of left foot ([facility])  S98.132A 895.0 AMB SUPPLY ORDER   3. History of amputation of lesser toe, right ([facility])  Z89.421 V49.72 AMB SUPPLY ORDER   4. Onychomycosis  B35.1 110.1 BIOPSY, NAIL UNIT   5. Hyperkeratosis  L85.9 701.1    6. Xerosis cutis  L85.3 706.8    7. Acute gouty arthritis  M10.9 274.01          Recommendation:     Patient seen and evaluated in the office  Discussed and educated patient regarding their current medical condition. Instructed patient to monitor their feet daily, be compliant with all medications and wear supportive shoe gear. A sharp, excisional debridement was performed down to the level of normal epidermis with a 15 blade to a total of 2 hyperkeratotic lesions noted to the plantar aspect of both feet. Instructed patient that he would need to limit focal pressure and shear forces to prevent lesions from returning  It was determined that a toenail plate biopsy will be taken of the right hallux to confirm the presence of fungal elements. This was performed with a nail nipper without incident. Patient tolerated well and no dressing was needed.   Instructed patient when pathology is return, will discuss treatment options in more depth  Gave a prescription for ammonium lactate 12% lotion to be applied to all affected dry skin  A sharp toenail plate debridement was performed to the remaining 8 digits without incident.   This was performed with a nail nipper and no dressing was needed  Lastly, a referral was given to be fitted for custom shoe inserts with toe fillers as he has had multiple digital amputations

## 2020-12-23 DIAGNOSIS — B35.1 ONYCHOMYCOSIS: Primary | ICD-10-CM

## 2020-12-25 LAB
ALBUMIN SERPL-MCNC: 4.1 G/DL (ref 3.7–4.7)
ALBUMIN SERPL-MCNC: 4.1 G/DL (ref 3.7–4.7)
ALBUMIN/GLOB SERPL: 1.5 {RATIO} (ref 1.2–2.2)
ALP SERPL-CCNC: 78 IU/L (ref 39–117)
ALP SERPL-CCNC: 78 IU/L (ref 39–117)
ALT SERPL-CCNC: 17 IU/L (ref 0–44)
ALT SERPL-CCNC: 18 IU/L (ref 0–44)
AST SERPL-CCNC: 16 IU/L (ref 0–40)
AST SERPL-CCNC: 18 IU/L (ref 0–40)
BILIRUB DIRECT SERPL-MCNC: 0.1 MG/DL (ref 0–0.4)
BILIRUB SERPL-MCNC: 0.3 MG/DL (ref 0–1.2)
BILIRUB SERPL-MCNC: 0.3 MG/DL (ref 0–1.2)
BUN SERPL-MCNC: 20 MG/DL (ref 8–27)
BUN/CREAT SERPL: 14 (ref 10–24)
CALCIUM SERPL-MCNC: 9.6 MG/DL (ref 8.6–10.2)
CHLORIDE SERPL-SCNC: 105 MMOL/L (ref 96–106)
CHOLEST SERPL-MCNC: 208 MG/DL (ref 100–199)
CO2 SERPL-SCNC: 24 MMOL/L (ref 20–29)
CREAT SERPL-MCNC: 1.39 MG/DL (ref 0.76–1.27)
GLOBULIN SER CALC-MCNC: 2.8 G/DL (ref 1.5–4.5)
GLUCOSE SERPL-MCNC: 105 MG/DL (ref 65–99)
HDLC SERPL-MCNC: 66 MG/DL
LDLC SERPL CALC-MCNC: 119 MG/DL (ref 0–99)
POTASSIUM SERPL-SCNC: 5 MMOL/L (ref 3.5–5.2)
PROT SERPL-MCNC: 6.9 G/DL (ref 6–8.5)
PROT SERPL-MCNC: 6.9 G/DL (ref 6–8.5)
SODIUM SERPL-SCNC: 141 MMOL/L (ref 134–144)
TRIGL SERPL-MCNC: 133 MG/DL (ref 0–149)
VLDLC SERPL CALC-MCNC: 23 MG/DL (ref 5–40)

## 2020-12-31 ENCOUNTER — TELEPHONE (OUTPATIENT)
Dept: PODIATRY | Age: 75
End: 2020-12-31

## 2021-01-01 RX ORDER — TERBINAFINE HYDROCHLORIDE 250 MG/1
250 TABLET ORAL DAILY
Qty: 90 TAB | Refills: 0 | Status: SHIPPED | OUTPATIENT
Start: 2021-01-01 | End: 2021-04-07

## 2021-01-07 ENCOUNTER — OFFICE VISIT (OUTPATIENT)
Dept: FAMILY MEDICINE CLINIC | Age: 76
End: 2021-01-07
Payer: MEDICARE

## 2021-01-07 VITALS
TEMPERATURE: 97 F | HEART RATE: 70 BPM | BODY MASS INDEX: 30.81 KG/M2 | WEIGHT: 240 LBS | DIASTOLIC BLOOD PRESSURE: 74 MMHG | RESPIRATION RATE: 18 BRPM | SYSTOLIC BLOOD PRESSURE: 136 MMHG | OXYGEN SATURATION: 98 %

## 2021-01-07 DIAGNOSIS — I11.9 MALIGNANT HYPERTENSIVE HEART DISEASE WITHOUT HEART FAILURE: ICD-10-CM

## 2021-01-07 DIAGNOSIS — J45.40 MODERATE PERSISTENT ASTHMA WITHOUT COMPLICATION: ICD-10-CM

## 2021-01-07 DIAGNOSIS — M10.9 ACUTE GOUTY ARTHRITIS: ICD-10-CM

## 2021-01-07 DIAGNOSIS — I73.9 PVD (PERIPHERAL VASCULAR DISEASE) (HCC): ICD-10-CM

## 2021-01-07 DIAGNOSIS — E78.2 MIXED HYPERLIPIDEMIA: Primary | ICD-10-CM

## 2021-01-07 PROCEDURE — 99213 OFFICE O/P EST LOW 20 MIN: CPT | Performed by: FAMILY MEDICINE

## 2021-01-07 NOTE — PROGRESS NOTES
Cheryl Chen is a 77 y.o. male and presents with Follow-up and Labs  . HPI     Subjective:  Cardiovascular Review:  The patient has hypertension   Diet and Lifestyle: generally follows a low fat low cholesterol diet, generally follows a low sodium diet, exercises sporadically  Home BP Monitoring: is not measured at home. Pertinent ROS: taking medications as instructed, no medication side effects noted, no TIA's, no chest pain on exertion, no dyspnea on exertion, no swelling of ankles. Review of Systems  Review of Systems   Constitutional: Negative. Negative for chills and fever. HENT: Negative. Negative for congestion, ear discharge, hearing loss, nosebleeds and tinnitus. Eyes: Negative. Negative for blurred vision, double vision, photophobia and pain. Respiratory: Negative. Negative for cough, hemoptysis and sputum production. Cardiovascular: Negative. Negative for chest pain and palpitations. Gastrointestinal: Negative. Negative for heartburn, nausea and vomiting. Genitourinary: Negative. Negative for dysuria, frequency and urgency. Musculoskeletal: Negative. Negative for back pain and myalgias. Skin: Negative. Neurological: Negative. Negative for dizziness, tingling, weakness and headaches. Endo/Heme/Allergies: Negative. Psychiatric/Behavioral: Negative. Negative for depression and suicidal ideas. The patient does not have insomnia. All other systems reviewed and are negative.         Past Medical History:   Diagnosis Date    Allergies     Colitis     Diabetes ([facility] [address]     pt denies he has diabetes    Eye problems     GERD (gastroesophageal reflux disease)     Gout 2012    right foot    History of osteomyelitis     Hx of long term use of blood thinners     plavix    Hypercholesterolemia     Hypertension     Other ill-defined conditions(799.89)     pherpherial vascular disease, foot ulcer     Past Surgical History:   Procedure Laterality Date    HX HEENT Right 2001    cataract removal    HX ORTHOPAEDIC Right 7/24/15    excision part of second toe    VASCULAR SURGERY PROCEDURE UNLIST Right 4/24/2015    RIGHT POPLITEAL-TIBIAL BYPASS - by Dr Divya Antoine     Social History     Socioeconomic History    Marital status:      Spouse name: Not on file    Number of children: Not on file    Years of education: Not on file    Highest education level: Not on file   Tobacco Use    Smoking status: Current Every Day Smoker     Packs/day: 0.50     Years: 55.00     Pack years: 27.50    Smokeless tobacco: Never Used    Tobacco comment: pateint states he is trying to quit   Substance and Sexual Activity    Alcohol use: Yes     Comment: occasional    Drug use: No     No family history on file. Current Outpatient Medications   Medication Sig Dispense Refill    terbinafine HCL (LAMISIL) 250 mg tablet Take 1 Tab by mouth daily. 90 Tab 0    ammonium lactate (AMLACTIN) 12 % topical cream Apply  to affected area two (2) times a day. rub in to affected area well 280 g 3    albuterol (PROVENTIL HFA, VENTOLIN HFA, PROAIR HFA) 90 mcg/actuation inhaler INHALE 2 PUFFS BY MOUTH EVERY 6 HOURS AS NEEDED FOR WHEEZING 9 g 0    albuterol (PROVENTIL HFA, VENTOLIN HFA, PROAIR HFA) 90 mcg/actuation inhaler INHALE 2 PUFFS BY MOUTH EVERY 6 HOURS AS NEEDED FOR WHEEZING 9 g 0    azithromycin (Zithromax Z-Michael) 250 mg tablet Take 2 tablet p.o. day 1 then 1 tablet p.o. day 2 through 5 6 Tab 0    albuterol (PROVENTIL HFA, VENTOLIN HFA, PROAIR HFA) 90 mcg/actuation inhaler Take 2 Puffs by inhalation every four (4) hours as needed for Wheezing. 1 Inhaler 0    dextromethorphan-guaiFENesin (Robitussin Cough-Chest Jaron DM) 5-100 mg/5 mL liqd Take 5 mL by mouth every six (6) hours. 200 mL 0    colchicine (Colcrys) 0.6 mg tablet Take 1 Tab by mouth two (2) times a day. 30 Tab 0    ibuprofen (MOTRIN) 600 mg tablet Take 1 Tab by mouth every eight (8) hours as needed for Pain.  30 Tab 0    amLODIPine (NORVASC) 10 mg tablet Take 1 tablet by mouth once daily 90 Tab 0    oxyCODONE-acetaminophen (PERCOCET) 5-325 mg per tablet Take 1 Tab by mouth every four (4) hours as needed for Pain. Max Daily Amount: 6 Tabs. 40 Tab 0    oxyCODONE-acetaminophen (PERCOCET) 5-325 mg per tablet Take 1 Tab by mouth every four (4) hours as needed for Pain.  Omega-3 Fatty Acids 300 mg cap Take  by mouth.  ibuprofen (MOTRIN) 200 mg tablet Take  by mouth every eight (8) hours as needed for Pain.  losartan (COZAAR) 100 mg tablet Take 100 mg by mouth daily. Allergies   Allergen Reactions    Sulfa (Sulfonamide Antibiotics) Hives and Rash     Pt reports recently started medication but got a rash    Bactrim [Sulfamethoprim Ds] Hives and Itching    Sulfur Dioxide Unknown (comments)       Objective:  Visit Vitals  /74 (BP 1 Location: Right arm, BP Patient Position: Sitting)   Pulse 70   Temp 97 °F (36.1 °C) (Temporal)   Resp 18   Wt 240 lb (108.9 kg)   SpO2 98% Comment: room air   BMI 30.81 kg/m²       Physical Exam:   Physical Exam  Vitals signs and nursing note reviewed. Constitutional:       General: He is not in acute distress. Appearance: Normal appearance. He is obese. He is not ill-appearing, toxic-appearing or diaphoretic. HENT:      Head: Normocephalic and atraumatic. Right Ear: Tympanic membrane, ear canal and external ear normal. There is no impacted cerumen. Left Ear: Tympanic membrane, ear canal and external ear normal. There is no impacted cerumen. Nose: Nose normal. No congestion or rhinorrhea. Mouth/Throat:      Mouth: Mucous membranes are moist.      Pharynx: Oropharynx is clear. No oropharyngeal exudate or posterior oropharyngeal erythema. Eyes:      General: No scleral icterus. Right eye: No discharge. Left eye: No discharge. Extraocular Movements: Extraocular movements intact.       Conjunctiva/sclera: Conjunctivae normal.      Pupils: Pupils are equal, round, and reactive to light. Neck:      Musculoskeletal: Normal range of motion and neck supple. No neck rigidity or muscular tenderness. Vascular: No carotid bruit. Cardiovascular:      Rate and Rhythm: Normal rate and regular rhythm. Pulses: Normal pulses. Heart sounds: Normal heart sounds. No murmur. No friction rub. No gallop. Pulmonary:      Effort: Pulmonary effort is normal. No respiratory distress. Breath sounds: Normal breath sounds. No stridor. No wheezing, rhonchi or rales. Chest:      Chest wall: No tenderness. Abdominal:      General: Abdomen is flat. Bowel sounds are normal. There is no distension. Palpations: Abdomen is soft. There is no mass. Tenderness: There is no abdominal tenderness. There is no right CVA tenderness, left CVA tenderness, guarding or rebound. Hernia: No hernia is present. Musculoskeletal: Normal range of motion. General: No swelling, tenderness, deformity or signs of injury. Right lower leg: No edema. Left lower leg: No edema. Lymphadenopathy:      Cervical: No cervical adenopathy. Skin:     General: Skin is warm. Capillary Refill: Capillary refill takes 2 to 3 seconds. Coloration: Skin is not jaundiced or pale. Findings: No bruising, erythema, lesion or rash. Neurological:      General: No focal deficit present. Mental Status: He is alert and oriented to person, place, and time. Mental status is at baseline. Cranial Nerves: No cranial nerve deficit. Sensory: No sensory deficit. Motor: No weakness. Coordination: Coordination normal.      Gait: Gait normal.      Deep Tendon Reflexes: Reflexes normal.   Psychiatric:         Mood and Affect: Mood normal.         Behavior: Behavior normal.         Thought Content:  Thought content normal.         Judgment: Judgment normal.             Results for orders placed or performed in visit on 12/23/20   HEPATIC FUNCTION PANEL Result Value Ref Range    Protein, total 6.9 6.0 - 8.5 g/dL    Albumin 4.1 3.7 - 4.7 g/dL    Bilirubin, total 0.3 0.0 - 1.2 mg/dL    Bilirubin, direct 0.10 0.00 - 0.40 mg/dL    Alk. phosphatase 78 39 - 117 IU/L    AST (SGOT) 18 0 - 40 IU/L    ALT (SGPT) 18 0 - 44 IU/L       Assessment/Plan:  No diagnosis found. No orders of the defined types were placed in this encounter. Cannot display discharge medications since this is not an admission. Follow-up and Dispositions    · Return in about 3 months (around 4/7/2021).

## 2021-02-16 ENCOUNTER — IMMUNIZATION (OUTPATIENT)
Dept: FAMILY MEDICINE CLINIC | Age: 76
End: 2021-02-16
Payer: MEDICARE

## 2021-02-16 DIAGNOSIS — Z23 ENCOUNTER FOR IMMUNIZATION: Primary | ICD-10-CM

## 2021-02-16 PROCEDURE — 0011A COVID-19, MRNA, LNP-S, PF, 100MCG/0.5ML DOSE(MODERNA): CPT | Performed by: FAMILY MEDICINE

## 2021-02-16 PROCEDURE — 91301 COVID-19, MRNA, LNP-S, PF, 100MCG/0.5ML DOSE(MODERNA): CPT | Performed by: FAMILY MEDICINE

## 2021-03-16 ENCOUNTER — IMMUNIZATION (OUTPATIENT)
Dept: FAMILY MEDICINE CLINIC | Age: 76
End: 2021-03-16
Payer: MEDICARE

## 2021-03-16 DIAGNOSIS — Z23 ENCOUNTER FOR IMMUNIZATION: Primary | ICD-10-CM

## 2021-03-16 PROCEDURE — 91301 COVID-19, MRNA, LNP-S, PF, 100MCG/0.5ML DOSE(MODERNA): CPT | Performed by: FAMILY MEDICINE

## 2021-03-16 PROCEDURE — 0012A COVID-19, MRNA, LNP-S, PF, 100MCG/0.5ML DOSE(MODERNA): CPT | Performed by: FAMILY MEDICINE

## 2021-03-22 ENCOUNTER — OFFICE VISIT (OUTPATIENT)
Dept: PODIATRY | Age: 76
End: 2021-03-22
Payer: MEDICARE

## 2021-03-22 VITALS
SYSTOLIC BLOOD PRESSURE: 142 MMHG | HEART RATE: 82 BPM | BODY MASS INDEX: 29.9 KG/M2 | OXYGEN SATURATION: 97 % | HEIGHT: 74 IN | TEMPERATURE: 97.7 F | WEIGHT: 233 LBS | DIASTOLIC BLOOD PRESSURE: 102 MMHG

## 2021-03-22 DIAGNOSIS — B35.1 ONYCHOMYCOSIS: ICD-10-CM

## 2021-03-22 DIAGNOSIS — L85.3 XEROSIS CUTIS: ICD-10-CM

## 2021-03-22 DIAGNOSIS — M10.9 ACUTE GOUTY ARTHRITIS: Primary | ICD-10-CM

## 2021-03-22 PROCEDURE — G8536 NO DOC ELDER MAL SCRN: HCPCS | Performed by: PODIATRIST

## 2021-03-22 PROCEDURE — 99213 OFFICE O/P EST LOW 20 MIN: CPT | Performed by: PODIATRIST

## 2021-03-22 PROCEDURE — G8510 SCR DEP NEG, NO PLAN REQD: HCPCS | Performed by: PODIATRIST

## 2021-03-22 PROCEDURE — 1101F PT FALLS ASSESS-DOCD LE1/YR: CPT | Performed by: PODIATRIST

## 2021-03-22 PROCEDURE — 3017F COLORECTAL CA SCREEN DOC REV: CPT | Performed by: PODIATRIST

## 2021-03-22 PROCEDURE — G8417 CALC BMI ABV UP PARAM F/U: HCPCS | Performed by: PODIATRIST

## 2021-03-22 PROCEDURE — G8427 DOCREV CUR MEDS BY ELIG CLIN: HCPCS | Performed by: PODIATRIST

## 2021-03-22 RX ORDER — COLCHICINE 0.6 MG/1
0.6 TABLET ORAL 2 TIMES DAILY
Qty: 30 TAB | Refills: 0 | Status: SHIPPED | OUTPATIENT
Start: 2021-03-22 | End: 2021-06-09 | Stop reason: SDUPTHER

## 2021-03-22 NOTE — PROGRESS NOTES
Chief Complaint   Patient presents with    Foot Exam     Pt states he is here to have surgical site checked out R foot toe beside great toe states toe is turning black    Toe Pain       1. Have you been to the ER, urgent care clinic since your last visit? Hospitalized since your last visit? No    2. Have you seen or consulted any other health care providers outside of the [address] [place] [address] since your last visit? Include any pap smears or colon screening.  No  PCP-Dr Kaykay Wilson

## 2021-03-22 NOTE — PROGRESS NOTES
[facility]    Subjective:         Patient is a 77 y.o. male who is being seen as a returning pt for multiple lower extremity complaints. Patient states he is suffering from acute on chronic pain in his feet, rising to the level of 8 out of 10. He states he has a history of gout but states he is out of his medication. He states his uric acid levels have not been drawn recently. He states he is currently taking his terbinafine 250 mg daily for his onychomycosis. He states he is noticing very minimal relief but realizes it will take months. Also states he is utilizing the prescription strength lotion he was prescribed last office visit for his severe dry skin. He states he has noticed great relief in his associated symptoms for his dry skin. He denies any recent trauma. He denies any systemic signs infection. He denies any other pedal complaints    Past Medical History:   Diagnosis Date    Allergies     Colitis     Diabetes ([facility] [address]     pt denies he has diabetes    Eye problems     GERD (gastroesophageal reflux disease)     Gout 2012    right foot    History of osteomyelitis     Hx of long term use of blood thinners     plavix    Hypercholesterolemia     Hypertension     Other ill-defined conditions(799.89)     pherpherial vascular disease, foot ulcer     Past Surgical History:   Procedure Laterality Date    HX HEENT Right 2001    cataract removal    HX ORTHOPAEDIC Right 7/24/15    excision part of second toe    VASCULAR SURGERY PROCEDURE UNLIST Right 4/24/2015    RIGHT POPLITEAL-TIBIAL BYPASS - by Dr Teena Marks       No family history on file.    Social History     Tobacco Use    Smoking status: Current Every Day Smoker     Packs/day: 0.50     Years: 55.00     Pack years: 27.50    Smokeless tobacco: Never Used    Tobacco comment: pateint states he is trying to quit   Substance Use Topics    Alcohol use: Yes     Comment: occasional     Allergies   Allergen Reactions    Sulfa (Sulfonamide Antibiotics) Hives and Rash     Pt reports recently started medication but got a rash    Bactrim [Sulfamethoprim Ds] Hives and Itching    Sulfur Dioxide Unknown (comments)     Prior to Admission medications    Medication Sig Start Date End Date Taking? Authorizing Provider   terbinafine HCL (LAMISIL) 250 mg tablet Take 1 Tab by mouth daily. 1/1/21   Faustino Henley DPM   ammonium lactate (AMLACTIN) 12 % topical cream Apply  to affected area two (2) times a day. rub in to affected area well 12/8/20   Faustino Henley DPM   albuterol (PROVENTIL HFA, VENTOLIN HFA, PROAIR HFA) 90 mcg/actuation inhaler INHALE 2 PUFFS BY MOUTH EVERY 6 HOURS AS NEEDED FOR WHEEZING 11/19/20   Nicholas Hart MD   albuterol (PROVENTIL HFA, VENTOLIN HFA, PROAIR HFA) 90 mcg/actuation inhaler INHALE 2 PUFFS BY MOUTH EVERY 6 HOURS AS NEEDED FOR WHEEZING 10/13/20   Nicholas Hart MD   azithromycin (Zithromax Z-Michael) 250 mg tablet Take 2 tablet p.o. day 1 then 1 tablet p.o. day 2 through 5 10/13/20   Bharath Ashton MD   albuterol (PROVENTIL HFA, VENTOLIN HFA, PROAIR HFA) 90 mcg/actuation inhaler Take 2 Puffs by inhalation every four (4) hours as needed for Wheezing. 10/13/20   Andrea Moyer MD   dextromethorphan-guaiFENesin (Robitussin Cough-Chest Jaron DM) 5-100 mg/5 mL liqd Take 5 mL by mouth every six (6) hours. 10/13/20   Andrea Moyer MD   colchicine (Colcrys) 0.6 mg tablet Take 1 Tab by mouth two (2) times a day. 10/8/20   Nicholas Hart MD   ibuprofen (MOTRIN) 600 mg tablet Take 1 Tab by mouth every eight (8) hours as needed for Pain. 10/8/20   Nicholas Hart MD   amLODIPine (NORVASC) 10 mg tablet Take 1 tablet by mouth once daily 7/29/20   Nicholas Hart MD   oxyCODONE-acetaminophen (PERCOCET) 5-325 mg per tablet Take 1 Tab by mouth every four (4) hours as needed for Pain. Max Daily Amount: 6 Tabs.  7/31/15   Jose Grande MD   oxyCODONE-acetaminophen (PERCOCET) 5-325 mg per tablet Take 1 Tab by mouth every four (4) hours as needed for Pain. Provider, Historical   Omega-3 Fatty Acids 300 mg cap Take  by mouth. Provider, Historical   ibuprofen (MOTRIN) 200 mg tablet Take  by mouth every eight (8) hours as needed for Pain. Provider, Historical   losartan (COZAAR) 100 mg tablet Take 100 mg by mouth daily. Provider, Historical       Review of Systems   Constitutional: Negative. HENT: Negative. Eyes: Negative. Respiratory: Negative. Cardiovascular: Negative. Gastrointestinal: Negative. Endocrine: Negative. Genitourinary: Negative. Musculoskeletal: Positive for arthralgias. Allergic/Immunologic: Positive for immunocompromised state. Hematological: Negative. Psychiatric/Behavioral: Negative. All other systems reviewed and are negative. Objective:     Visit Vitals  BP (!) 142/102 (BP 1 Location: Left upper arm, BP Patient Position: Sitting, BP Cuff Size: Large adult)   Pulse 82   Temp 97.7 °F (36.5 °C) (Temporal)   Ht 6' 2\" (1.88 m)   Wt 233 lb (105.7 kg)   SpO2 97%   BMI 29.92 kg/m²       Physical Exam  Vitals signs reviewed. Constitutional:       Appearance: He is obese. Cardiovascular:      Pulses:           Dorsalis pedis pulses are 1+ on the right side and 1+ on the left side. Posterior tibial pulses are 1+ on the right side and 1+ on the left side. Pulmonary:      Effort: Pulmonary effort is normal.   Musculoskeletal:      Right lower leg: No edema. Left lower leg: No edema. Right foot: Decreased range of motion. Deformity present. No bunion or Charcot foot. Left foot: Decreased range of motion. Deformity present. No bunion or Charcot foot. Feet:      Right foot:      Protective Sensation: 10 sites tested. 10 sites sensed. Skin integrity: Dry skin present. Toenail Condition: Right toenails are abnormally thick. Fungal disease present. Left foot:      Protective Sensation: 10 sites tested. 10 sites sensed. Skin integrity: Dry skin present. Toenail Condition: Left toenails are abnormally thick. Fungal disease present. Lymphadenopathy:      Lower Body: No right inguinal adenopathy. No left inguinal adenopathy. Skin:     General: Skin is warm. Capillary Refill: Capillary refill takes 2 to 3 seconds. Neurological:      Mental Status: He is alert and oriented to person, place, and time. Psychiatric:         Mood and Affect: Mood and affect normal.         Behavior: Behavior is cooperative. Data Review: No results found for this or any previous visit (from the past 24 hour(s)). Impression:     Onychomycosis  Xerosis cutis  Acute on chronic gouty arthritis    Recommendation:     Patient seen and evaluated in the office  Discussed and educated patient regarding their current medical condition. Instructed patient to monitor their feet daily, be compliant with all medications and wear supportive shoe gear. Patient to continue with the ammonium lactate 12% lotion to be utilized twice daily on all affected dry skin  Patient to continue with his terbinafine 250 mg daily for his onychomycosis  An in-depth conversation was had regarding his gouty arthritis. A prescription was given for colchicine 0.6 mg to be taken as directed.   Also a prescription was given for uric acid levels to be drawn for medication dosing purposes

## 2021-03-23 LAB — URATE SERPL-MCNC: 7 MG/DL (ref 3.8–8.4)

## 2021-03-23 RX ORDER — ALLOPURINOL 100 MG/1
100 TABLET ORAL DAILY
Qty: 30 TAB | Refills: 2 | Status: SHIPPED | OUTPATIENT
Start: 2021-03-23 | End: 2021-09-03 | Stop reason: SDUPTHER

## 2021-03-23 NOTE — PROGRESS NOTES
Please let Mr. Samantha Xiao know that his uric acid is elevated. Ask him how he is feeling after taking the colchicine.   Also let him know I have sent a prescription for allopurinol 100 mg to be taken daily

## 2021-04-07 ENCOUNTER — OFFICE VISIT (OUTPATIENT)
Dept: FAMILY MEDICINE CLINIC | Age: 76
End: 2021-04-07
Payer: MEDICARE

## 2021-04-07 VITALS
HEART RATE: 92 BPM | RESPIRATION RATE: 16 BRPM | BODY MASS INDEX: 29.77 KG/M2 | OXYGEN SATURATION: 96 % | HEIGHT: 74 IN | DIASTOLIC BLOOD PRESSURE: 100 MMHG | SYSTOLIC BLOOD PRESSURE: 130 MMHG | WEIGHT: 232 LBS

## 2021-04-07 DIAGNOSIS — J45.40 MODERATE PERSISTENT ASTHMA WITHOUT COMPLICATION: ICD-10-CM

## 2021-04-07 DIAGNOSIS — M10.9 GOUTY ARTHROPATHY: ICD-10-CM

## 2021-04-07 DIAGNOSIS — K40.90 LEFT INGUINAL HERNIA: ICD-10-CM

## 2021-04-07 DIAGNOSIS — M17.5 OTHER SECONDARY OSTEOARTHRITIS OF LEFT KNEE: ICD-10-CM

## 2021-04-07 DIAGNOSIS — F17.200 TOBACCO DEPENDENCE SYNDROME: ICD-10-CM

## 2021-04-07 DIAGNOSIS — E78.2 MIXED HYPERLIPIDEMIA: Primary | ICD-10-CM

## 2021-04-07 DIAGNOSIS — I11.9 MALIGNANT HYPERTENSIVE HEART DISEASE WITHOUT HEART FAILURE: ICD-10-CM

## 2021-04-07 DIAGNOSIS — Z71.6 TOBACCO ABUSE COUNSELING: ICD-10-CM

## 2021-04-07 PROBLEM — M19.90 DJD (DEGENERATIVE JOINT DISEASE): Status: ACTIVE | Noted: 2021-04-07

## 2021-04-07 PROCEDURE — 1101F PT FALLS ASSESS-DOCD LE1/YR: CPT | Performed by: FAMILY MEDICINE

## 2021-04-07 PROCEDURE — G8417 CALC BMI ABV UP PARAM F/U: HCPCS | Performed by: FAMILY MEDICINE

## 2021-04-07 PROCEDURE — G8427 DOCREV CUR MEDS BY ELIG CLIN: HCPCS | Performed by: FAMILY MEDICINE

## 2021-04-07 PROCEDURE — 99214 OFFICE O/P EST MOD 30 MIN: CPT | Performed by: FAMILY MEDICINE

## 2021-04-07 PROCEDURE — 3017F COLORECTAL CA SCREEN DOC REV: CPT | Performed by: FAMILY MEDICINE

## 2021-04-07 PROCEDURE — G8432 DEP SCR NOT DOC, RNG: HCPCS | Performed by: FAMILY MEDICINE

## 2021-04-07 PROCEDURE — G8536 NO DOC ELDER MAL SCRN: HCPCS | Performed by: FAMILY MEDICINE

## 2021-04-07 NOTE — PROGRESS NOTES
Faby Shea is a 77 y.o. male and presents with Cholesterol Problem (3 mo f/u ), Hypertension, and Medication Evaluation (HCTZ pt reports taking for bp do not see on med list )  . HPI   78 yo AAM with a hx of HTN and gouty arthropathy here on follow up c/o a 3 week hx of left knee pain and left groin pain both of which are getting worse intermettently but not intractable    Subjective:  Cardiovascular Review:  The patient has hypertension   Diet and Lifestyle: generally follows a low fat low cholesterol diet, generally follows a low sodium diet, exercises sporadically  Home BP Monitoring: is not measured at home. Pertinent ROS: taking medications as instructed, no medication side effects noted, no TIA's, no chest pain on exertion, no dyspnea on exertion, no swelling of ankles. Review of Systems  Review of Systems   Constitutional: Negative. Negative for chills and fever. HENT: Negative. Negative for congestion, ear discharge, hearing loss, nosebleeds and tinnitus. Eyes: Negative. Negative for blurred vision, double vision, photophobia and pain. Respiratory: Negative. Negative for cough, hemoptysis and sputum production. Cardiovascular: Negative. Negative for chest pain and palpitations. Gastrointestinal: Negative. Negative for heartburn, nausea and vomiting. Genitourinary: Negative. Negative for dysuria, frequency and urgency. Musculoskeletal: Positive for joint pain. Negative for back pain and myalgias. Skin: Negative. Neurological: Negative. Negative for dizziness, tingling, weakness and headaches. Endo/Heme/Allergies: Negative. Psychiatric/Behavioral: Negative. Negative for depression and suicidal ideas. The patient does not have insomnia. All other systems reviewed and are negative.         Past Medical History:   Diagnosis Date    Allergies     Colitis     Diabetes ([facility] [address]     pt denies he has diabetes    Eye problems     GERD (gastroesophageal reflux disease)     Gout 2012    right foot    History of osteomyelitis     Hx of long term use of blood thinners     plavix    Hypercholesterolemia     Hypertension     Other ill-defined conditions(799.89)     pherpherial vascular disease, foot ulcer     Past Surgical History:   Procedure Laterality Date    HX HEENT Right 2001    cataract removal    HX ORTHOPAEDIC Right 7/24/15    excision part of second toe    VASCULAR SURGERY PROCEDURE UNLIST Right 4/24/2015    RIGHT POPLITEAL-TIBIAL BYPASS - by Dr Nena Arriola     Social History     Socioeconomic History    Marital status:      Spouse name: Not on file    Number of children: Not on file    Years of education: Not on file    Highest education level: Not on file   Tobacco Use    Smoking status: Current Every Day Smoker     Packs/day: 0.50     Years: 55.00     Pack years: 27.50    Smokeless tobacco: Never Used    Tobacco comment: pateint states he is trying to quit   Substance and Sexual Activity    Alcohol use: Yes     Comment: occasional    Drug use: No     History reviewed. No pertinent family history. Current Outpatient Medications   Medication Sig Dispense Refill    allopurinoL (ZYLOPRIM) 100 mg tablet Take 1 Tab by mouth daily. 30 Tab 2    colchicine (Colcrys) 0.6 mg tablet Take 1 Tab by mouth two (2) times a day. 30 Tab 0    ammonium lactate (AMLACTIN) 12 % topical cream Apply  to affected area two (2) times a day. rub in to affected area well (Patient taking differently: Apply  to affected area as needed. rub in to affected area well) 280 g 3    albuterol (PROVENTIL HFA, VENTOLIN HFA, PROAIR HFA) 90 mcg/actuation inhaler INHALE 2 PUFFS BY MOUTH EVERY 6 HOURS AS NEEDED FOR WHEEZING 9 g 0    amLODIPine (NORVASC) 10 mg tablet Take 1 tablet by mouth once daily 90 Tab 0    ibuprofen (MOTRIN) 200 mg tablet Take  by mouth every eight (8) hours as needed for Pain.  Omega-3 Fatty Acids 300 mg cap Take  by mouth.       losartan (COZAAR) 100 mg tablet Take 100 mg by mouth daily. Allergies   Allergen Reactions    Sulfa (Sulfonamide Antibiotics) Hives and Rash     Pt reports recently started medication but got a rash    Bactrim [Sulfamethoprim Ds] Hives and Itching    Sulfur Dioxide Unknown (comments)       Objective:  Visit Vitals  BP (!) 130/100 (BP 1 Location: Left upper arm, BP Patient Position: Sitting, BP Cuff Size: Adult)   Pulse 92   Resp 16   Ht 6' 2\" (1.88 m)   Wt 232 lb (105.2 kg)   SpO2 96%   BMI 29.79 kg/m²       Physical Exam:   Physical Exam  Vitals signs and nursing note reviewed. Constitutional:       General: He is not in acute distress. Appearance: Normal appearance. He is obese. He is not ill-appearing, toxic-appearing or diaphoretic. HENT:      Head: Normocephalic and atraumatic. Right Ear: Tympanic membrane, ear canal and external ear normal. There is no impacted cerumen. Left Ear: Tympanic membrane, ear canal and external ear normal. There is no impacted cerumen. Nose: Nose normal. No congestion or rhinorrhea. Mouth/Throat:      Mouth: Mucous membranes are moist.      Pharynx: Oropharynx is clear. No oropharyngeal exudate or posterior oropharyngeal erythema. Eyes:      General: No scleral icterus. Right eye: No discharge. Left eye: No discharge. Extraocular Movements: Extraocular movements intact. Conjunctiva/sclera: Conjunctivae normal.      Pupils: Pupils are equal, round, and reactive to light. Neck:      Musculoskeletal: Normal range of motion and neck supple. No neck rigidity or muscular tenderness. Vascular: No carotid bruit. Cardiovascular:      Rate and Rhythm: Normal rate and regular rhythm. Pulses: Normal pulses. Heart sounds: Normal heart sounds. No murmur. No friction rub. No gallop. Pulmonary:      Effort: Pulmonary effort is normal. No respiratory distress. Breath sounds: Normal breath sounds. No stridor.  No wheezing, rhonchi or rales.   Chest:      Chest wall: No tenderness. Abdominal:      General: Abdomen is flat. Bowel sounds are normal. There is no distension. Palpations: Abdomen is soft. There is no mass. Tenderness: There is no abdominal tenderness (left lower quadrant). There is no right CVA tenderness, left CVA tenderness, guarding or rebound. Hernia: No hernia is present. Musculoskeletal: Normal range of motion. General: No swelling, tenderness, deformity or signs of injury. Right lower leg: No edema. Left lower leg: No edema. Lymphadenopathy:      Cervical: No cervical adenopathy. Skin:     General: Skin is warm. Capillary Refill: Capillary refill takes 2 to 3 seconds. Coloration: Skin is not jaundiced or pale. Findings: No bruising, erythema, lesion or rash. Neurological:      General: No focal deficit present. Mental Status: He is alert and oriented to person, place, and time. Mental status is at baseline. Cranial Nerves: No cranial nerve deficit. Sensory: No sensory deficit. Motor: No weakness. Coordination: Coordination normal.      Gait: Gait normal.      Deep Tendon Reflexes: Reflexes normal.   Psychiatric:         Mood and Affect: Mood normal.         Behavior: Behavior normal.         Thought Content: Thought content normal.         Judgment: Judgment normal.             Results for orders placed or performed in visit on 03/22/21   URIC ACID   Result Value Ref Range    Uric acid 7.0 3.8 - 8.4 mg/dL       Assessment/Plan:  No diagnosis found. No orders of the defined types were placed in this encounter. Cannot display discharge medications since this is not an admission. Follow-up and Dispositions    · Return in about 3 months (around 7/7/2021).

## 2021-04-07 NOTE — PROGRESS NOTES
Room:     Identified pt with two pt identifiers(name and ). Reviewed record in preparation for visit and have obtained necessary documentation. All patient medications has been reviewed. Chief Complaint   Patient presents with    Cholesterol Problem     3 mo f/u     Hypertension    Medication Evaluation     HCTZ pt reports taking for bp do not see on med list        Health Maintenance Due   Topic    Hepatitis C Screening     DTaP/Tdap/Td series (1 - Tdap)    Shingrix Vaccine Age 49> (1 of 2)    Colorectal Cancer Screening Combo     AAA Screening 73-67 YO Male Smoking Patients     Pneumococcal 65+ years (1 of 1 - PPSV23)    Medicare Yearly Exam        Colonoscopy: never done     Shingrix: not completed     Vitals:    21 1107   BP: (!) 130/100   Pulse: 92   Resp: 16   SpO2: 96%   Weight: 232 lb (105.2 kg)   Height: 6' 2\" (1.88 m)   PainSc:   0 - No pain       4. Have you been to the ER, urgent care clinic since your last visit? Hospitalized since your last visit? No    5. Have you seen or consulted any other health care providers outside of the [address] [place] [address] since your last visit? Include any pap smears or colon screening. No      Patient is accompanied by self I have received verbal consent from Deandre Rankin to discuss any/all medical information while they are present in the room.

## 2021-06-09 ENCOUNTER — OFFICE VISIT (OUTPATIENT)
Dept: FAMILY MEDICINE CLINIC | Age: 76
End: 2021-06-09
Payer: MEDICARE

## 2021-06-09 VITALS
HEIGHT: 74 IN | TEMPERATURE: 97.8 F | SYSTOLIC BLOOD PRESSURE: 130 MMHG | DIASTOLIC BLOOD PRESSURE: 78 MMHG | RESPIRATION RATE: 20 BRPM | OXYGEN SATURATION: 98 % | BODY MASS INDEX: 29.54 KG/M2 | WEIGHT: 230.2 LBS | HEART RATE: 89 BPM

## 2021-06-09 DIAGNOSIS — S43.421A SPRAIN OF RIGHT ROTATOR CUFF CAPSULE, INITIAL ENCOUNTER: ICD-10-CM
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
## 2021-06-09 DIAGNOSIS — Z09 HOSPITAL DISCHARGE FOLLOW-UP: Primary | ICD-10-CM

## 2021-06-09 PROCEDURE — 99496 TRANSJ CARE MGMT HIGH F2F 7D: CPT | Performed by: FAMILY MEDICINE

## 2021-06-09 PROCEDURE — 1111F DSCHRG MED/CURRENT MED MERGE: CPT | Performed by: FAMILY MEDICINE

## 2021-06-09 PROCEDURE — G8427 DOCREV CUR MEDS BY ELIG CLIN: HCPCS | Performed by: FAMILY MEDICINE

## 2021-06-09 RX ORDER — COLCHICINE 0.6 MG/1
0.6 TABLET ORAL 2 TIMES DAILY
Qty: 30 TABLET | Refills: 0 | Status: SHIPPED | OUTPATIENT
Start: 2021-06-09 | End: 2021-09-15 | Stop reason: SDUPTHER

## 2021-06-09 RX ORDER — HYDROCHLOROTHIAZIDE 25 MG/1
TABLET ORAL
Qty: 90 TABLET | Refills: 0 | Status: SHIPPED | OUTPATIENT
Start: 2021-06-09 | End: 2021-09-03 | Stop reason: SDUPTHER

## 2021-06-09 NOTE — PROGRESS NOTES
Transitional Care Management Progress Note    Patient: Iván Saldana  : [id number]  PCP: Mariangel Arzate MD    Date of office visit: 2021   Date of admission: ate of discharge: 2021  Hospital: [facility]    Call initiated w/i 2 business dates of discharge: *No response documented in the last 14 days   Date of the most recent call to the patient: *No documented post hospital discharge outreach found in the last 14 days      Assessment/Plan:   The complexity of medical decision making for this patient's transitional care is high      Subjective:   Iván Saldana is a 77 y.o. male presenting today for follow-up after hospital discharge. This encounter and supporting documentation was reviewed if available. Medication reconciliation was performed today. The main problem requiring admission was fall with right shoulder sprain and referral to Orthopedist that patient states is too far away. Complications during admission: none      Interval history/Current status: fair    Admitting symptoms have: not changed      Medications marked \"taking\" at this time:  Prior to Admission medications    Medication Sig Start Date End Date Taking? Authorizing Provider   allopurinoL (ZYLOPRIM) 100 mg tablet Take 1 Tab by mouth daily. 3/23/21   Obie Henley DPM   colchicine (Colcrys) 0.6 mg tablet Take 1 Tab by mouth two (2) times a day. 3/22/21   Obie Henley DPM   ammonium lactate (AMLACTIN) 12 % topical cream Apply  to affected area two (2) times a day. rub in to affected area well  Patient taking differently: Apply  to affected area as needed.  rub in to affected area well 20   Obie Henley DPM   albuterol (PROVENTIL HFA, VENTOLIN HFA, PROAIR HFA) 90 mcg/actuation inhaler INHALE 2 PUFFS BY MOUTH EVERY 6 HOURS AS NEEDED FOR WHEEZING 10/13/20   Mariangel Arzate MD   amLODIPine (NORVASC) 10 mg tablet Take 1 tablet by mouth once daily 20 Bell Irvin MD   Omega-3 Fatty Acids 300 mg cap Take  by mouth. Provider, Historical   ibuprofen (MOTRIN) 200 mg tablet Take  by mouth every eight (8) hours as needed for Pain. Provider, Historical   losartan (COZAAR) 100 mg tablet Take 100 mg by mouth daily. Provider, Historical        ROS:  Negative except joint pain-shoulder             Objective:     Visit Vitals  /78 (BP 1 Location: Left upper arm, BP Patient Position: Sitting, BP Cuff Size: Adult)   Pulse 89   Temp 97.8 °F (36.6 °C) (Oral)   Resp 20   Ht 6' 2\" (1.88 m)   Wt 230 lb 3.2 oz (104.4 kg)   SpO2 98% Comment: room air   BMI 29.56 kg/m²        Physical Examination: General appearance - alert, well appearing, and in no distress     Sutures intact in chin. Right shoulder decreased ROM  We discussed the expected course, resolution and complications of the diagnosis(es) in detail. Medication risks, benefits, costs, interactions, and alternatives were discussed as indicated. I advised him to contact the office if his condition worsens, changes or fails to improve as anticipated. He expressed understanding with the diagnosis(es) and plan.      Danny Andrew MD
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
## 2021-06-15 ENCOUNTER — TRANSCRIBE ORDER (OUTPATIENT)
Dept: SCHEDULING | Age: 76
End: 2021-06-15

## 2021-06-15 DIAGNOSIS — S43.004A UNSPECIFIED DISLOCATION OF RIGHT SHOULDER JOINT, INITIAL ENCOUNTER: Primary | ICD-10-CM

## 2021-06-16 ENCOUNTER — OFFICE VISIT (OUTPATIENT)
Dept: FAMILY MEDICINE CLINIC | Age: 76
End: 2021-06-16
Payer: MEDICARE

## 2021-06-16 VITALS
WEIGHT: 237.4 LBS | DIASTOLIC BLOOD PRESSURE: 77 MMHG | OXYGEN SATURATION: 99 % | TEMPERATURE: 98 F | HEIGHT: 74 IN | HEART RATE: 80 BPM | BODY MASS INDEX: 30.47 KG/M2 | RESPIRATION RATE: 20 BRPM | SYSTOLIC BLOOD PRESSURE: 130 MMHG

## 2021-06-16 DIAGNOSIS — Z48.02 ENCOUNTER FOR REMOVAL OF SUTURES: Primary | ICD-10-CM

## 2021-06-16 DIAGNOSIS — S01.81XD CHIN LACERATION, SUBSEQUENT ENCOUNTER: ICD-10-CM

## 2021-06-16 PROCEDURE — G8536 NO DOC ELDER MAL SCRN: HCPCS | Performed by: FAMILY MEDICINE

## 2021-06-16 PROCEDURE — G8417 CALC BMI ABV UP PARAM F/U: HCPCS | Performed by: FAMILY MEDICINE

## 2021-06-16 PROCEDURE — G8510 SCR DEP NEG, NO PLAN REQD: HCPCS | Performed by: FAMILY MEDICINE

## 2021-06-16 PROCEDURE — 1101F PT FALLS ASSESS-DOCD LE1/YR: CPT | Performed by: FAMILY MEDICINE

## 2021-06-16 PROCEDURE — 99213 OFFICE O/P EST LOW 20 MIN: CPT | Performed by: FAMILY MEDICINE

## 2021-06-16 PROCEDURE — G8427 DOCREV CUR MEDS BY ELIG CLIN: HCPCS | Performed by: FAMILY MEDICINE

## 2021-06-16 PROCEDURE — 3017F COLORECTAL CA SCREEN DOC REV: CPT | Performed by: FAMILY MEDICINE

## 2021-06-17 ENCOUNTER — HOSPITAL ENCOUNTER (OUTPATIENT)
Dept: PHYSICAL THERAPY | Age: 76
Discharge: HOME | End: 2021-06-17
Payer: MEDICARE

## 2021-06-17 PROCEDURE — 97163 PT EVAL HIGH COMPLEX 45 MIN: CPT

## 2021-06-17 NOTE — PROGRESS NOTES
[address] [address] [address] [address] [facility], [facility]  Ph: [phone]    Fax: [phone]    Initial Evaluation/Plan of Care/Statement of Necessity for Physical Therapy Services     Patient name: Cassia Liu    Date/Start of Care:2021  : 1945  [x]  Patient  Verified Provider#: [id number]          Referral source: Aniket Loera MD Return visit to MD: 21    Medical/Treatment Diagnosis: Pain in right shoulder [M25.511]  Sprain of right rotator cuff capsule, initial encounter [S43.421A]    Payor: Manuel Clark / Plan: [employer] / Product Type: [employer] Medicare /       Prior Hospitalization: see medical history     Comorbidities: see chart   Prior Level of Function: independent   Medications: Verified on Patient Summary List          Patient / Family readiness to learn indicated by: asking questions, trying to perform skills and interest  Persons(s) to be included in education: patient (P)  Barriers to Learning/Limitations: None  Patient Self Reported Health Status: good  Rehabilitation Potential: fair- good  Previous Treatment/Compliance: none  PMHx/Surgical Hx: high BP  Work Hx: retried   Living Situation: Lives with family   Barriers to progress: may have RC tear   Motivation: good  Substance use: N/A  Cognition: A & O x 4   Onset Date:    In time:3:00   Out time: 3:45  Total Treatment Time (min): 45  Total Timed Codes (min): 0 1:1 Treatment Time ([address] only): 0  Visit #: 1 Visit count could not be calculated. Make sure you are using a visit which is associated with an episode. SUBJECTIVE  Patient was referred to PT skilled due to R shoulder pain. Patient reports he fall from a ladder on his right shoulder, he called 911 and he went to [address] [place] [address]. At [address] [place] [address] they did x-ray which show nothing was broken but they put his shoulder back into socket.  He is scheduled to have an MRI on 21 due to suspecting RC injury. Patient reports no pain at rest  unless he tries to move the shoulder and lift his arm. He reports difficulty raising his arm and pain at top of his shoulder. Functional limitations: getting dress, getting up and push up from a chair, reaching up or back, lifting anything, he drives only with his L hand and he can not lay on his R shoulder. Mechanism of Injury: Fall 6/11  PAIN:  Area of pain: R shoulder   Pain Level (0-10 scale)  At rest: 0/10  With activity:  8/10    Worst: 10/10   Least: 0/10   Pain Level (0-10 scale) pre treatment: 0/10  Pain Level (0-10 scale) post treatment: 0/10   Goal:  \"Have a full use of my arm. \"   OBJECTIVE  Physical Findings   Ortho:   Posture: rounded shoulder, R shoulder hiked, bruised, (+) sulcus sign    Palpation: tight UT and tender deltoid   Gross findings:  R handed   Specific joints: *normal values in ()    SHOULDER                                         AROM   PROM            MMT   R L R L R L   Flexion (180) 30p! 180  120 p!   2+ 5   Extension (60)         Abduction (180) 25p! 180 132  2+ 5   Adduction (0)         IR (70)   60p!  2+ 4   ER (90)   30 p!  2+ 4   Horizontal Abduction (130)         Horizontal Adduction (45)         Additional comments: hiking R shoulder, and increased hiking with shoulder abduction  [place]y   IR T-10 bilaterally     ELBOW                             AROM                             PROM                             MMT   R L R L R L   Flexion (150)     4+ 4+   Extension (0)     4+ 4+   Additional comments: WNL         SPINE:   CERVICAL                                                ROM                                  Strength   Flexion  60     Extension 45    Protraction     Retraction        R L R L   Lateral Flexion (45) 30  25      Rotation (90) 50  40     Additional comments: mild pulling on R upper trap and cervical    Strength: (L) 58lbs / R 50lbs    Cervical   Neurological: Reflexes / Sensations: Intact to light touch bilateral UEs  Diff shrugging   Shoulder  Joint Mobility Assessment: Glenohumeral: Increased laxity       Acromioclavicular: WNL       Sternoclavicular: WNL    Special Tests: (+) drop arm  , (+) ER test , (+) full can  Neer Impingement: (-)    Load and Shift: (+)   Apprehension: (+)   Yergason: (-)    AC Compression: (-)   AC Crossover: (-)  TUG TBA    With   [] TE   [] TA   [] Neuro   [] SC   [] other: Patient Education: [] Review HEP    [] Progressed/Changed HEP based on:   [x] positioning   [] body mechanics   [x] transfers   [] heat/ice application    [] other:      ASSESSMENT/Changes in Function:   Patient is a 76 y/o male s/p a fall from a ladder on his R shoulder. Patient presents with decreased A/PROM in R shoulder, decreased strength, pain with movement, (+) sulcus sign and weak RC tendon. Patient may present with RC tear and he will benefit from MRI to confirm pathology and initiation of skilled PT  based on imaging vs surgery. Problem List/Impairments: pain affecting function, decrease ROM, decrease strength, edema affecting function, impaired gait/ balance, decrease ADL/ functional abilitiies, decrease activity tolerance, decrease flexibility/ joint mobility and decrease transfer abilities  Treatment Plan may include any combination of the following: Therapeutic exercise, Neuromuscular re-education, Physical agent/modality, Gait/balance training, Manual therapy, Patient education and Functional mobility training  Patient/ Caregiver education and instruction: exercises  Frequency / Duration: Patient to be seen 2 times per week for 12-16  treatments. Certification Period: 6/17/21-9/27/21   Patient Goal (s): To fully use my arm       Short Term Goals: To be accomplished in 2-4 treatments. 1. Patient will be independent with his HEP to progress with POC. 2. Patient pain will decreased to <4/10 with shoulder movement     Long Term Goals: To be accomplished in 16  treatments.   1. Patient will gain 10-15 deg of Shoulder AROM in all directions   2. Patient will gain 1/2 to 1 grade of strength in R shoulder   3. Patient will report ease with getting dress and doing his morning ADLs      [x]  Plan of care has been reviewed with PTA. The Plan of Care is based on information from the initial evaluation. Annie Koch, PT, DPT 6/17/2021   ________________________________________________________________________    I certify that the above Therapy Services are being furnished while the patient is under my care. I agree with the treatment plan and certify that this therapy is necessary.     Physician's Signature:_________________________________________________  Date:____________Time: ____________     Andrea Irvin MD

## 2021-06-18 PROBLEM — Z48.02 ENCOUNTER FOR REMOVAL OF SUTURES: Status: ACTIVE | Noted: 2021-06-18

## 2021-06-18 PROBLEM — S01.81XA CHIN LACERATION: Status: ACTIVE | Noted: 2021-06-18

## 2021-06-18 NOTE — PROGRESS NOTES
HPI    Donnie Carreon is a 77 y.o. male and presents today for Suture Removal  .  HPI   76 yo AAM with a hx of HTN and Gout s/p fall from ladder and chin laceration with ER repair on 6/9/2021 here for suture removal. Denies laceration site pain or numbness or discharge  Allergies    Allergies   Allergen Reactions    Sulfa (Sulfonamide Antibiotics) Hives and Rash     Pt reports recently started medication but got a rash    Bactrim [Sulfamethoprim Ds] Hives and Itching    Sulfur Dioxide Unknown (comments)        Medications    Current Outpatient Medications   Medication Sig Dispense    colchicine (Colcrys) 0.6 mg tablet Take 1 Tablet by mouth two (2) times a day. 30 Tablet    hydroCHLOROthiazide (HYDRODIURIL) 25 mg tablet Take 1 tablet by mouth once daily for 90 days 90 Tablet    allopurinoL (ZYLOPRIM) 100 mg tablet Take 1 Tab by mouth daily. 30 Tab    ammonium lactate (AMLACTIN) 12 % topical cream Apply  to affected area two (2) times a day. rub in to affected area well (Patient taking differently: Apply  to affected area as needed. rub in to affected area well) 280 g    albuterol (PROVENTIL HFA, VENTOLIN HFA, PROAIR HFA) 90 mcg/actuation inhaler INHALE 2 PUFFS BY MOUTH EVERY 6 HOURS AS NEEDED FOR WHEEZING 9 g    amLODIPine (NORVASC) 10 mg tablet Take 1 tablet by mouth once daily 90 Tab    Omega-3 Fatty Acids 300 mg cap Take  by mouth.  ibuprofen (MOTRIN) 200 mg tablet Take  by mouth every eight (8) hours as needed for Pain.  losartan (COZAAR) 100 mg tablet Take 100 mg by mouth daily. No current facility-administered medications for this visit.         Health Maintenance    Health Maintenance Due   Topic Date Due    Hepatitis C Screening  Never done    DTaP/Tdap/Td series (1 - Tdap) Never done    Shingrix Vaccine Age 50> (1 of 2) Never done    Colorectal Cancer Screening Combo  Never done    AAA Screening 73-67 YO Male Smoking Patients  Never done    Pneumococcal 65+ years (1 of 1 - PPSV23) Never done    Medicare Yearly Exam  Never done        Problem List    Patient Active Problem List    Diagnosis Date Noted    Encounter for removal of sutures 06/18/2021    Chin laceration 06/18/2021   [facility] discharge follow-up 06/09/2021    Fall 06/09/2021    Sprain of right rotator cuff capsule 06/09/2021    Gouty arthropathy 04/07/2021    DJD (degenerative joint disease) 04/07/2021    Left inguinal hernia 04/07/2021    Amputation of fifth toe of left foot ([facility] [address] 12/08/2020    History of amputation of lesser toe, right ([facility] [address] 12/08/2020    Onychomycosis 12/08/2020    Hyperkeratosis 12/08/2020    Xerosis cutis 12/08/2020    Cough, persistent 12/03/2020    Noncompliance with medications 12/03/2020    Encounter for immunization 11/05/2020    Cough 10/21/2020    Moderate persistent asthma without complication 34/64/2158    Acute gouty arthritis 10/08/2020    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      Social Hx    Social History     Socioeconomic History    Marital status:      Spouse name: Not on file    Number of children: Not on file    Years of education: Not on file    Highest education level: Not on file   Tobacco Use    Smoking status: Current Every Day Smoker     Packs/day: 0.50     Years: 55.00     Pack years: 27.50    Smokeless tobacco: Never Used    Tobacco comment: pateint states he is trying to quit   Vaping Use    Vaping Use: Never used   Substance and Sexual Activity    Alcohol use: Yes     Comment: occasional    Drug use: No     Social Determinants of Health     Financial Resource Strain:     Difficulty of Paying Living Expenses:    Food Insecurity:     Worried About Running Out of Food in the Last Year:  Ran Out of Food in the Last Year:    Transportation Needs:     Lack of Transportation (Medical):  Lack of Transportation (Non-Medical):    Physical Activity:     Days of Exercise per Week:     Minutes of Exercise per Session:    Stress:     Feeling of Stress :    Social Connections:     Frequency of Communication with Friends and Family:     Frequency of Social Gatherings with Friends and Family:     Attends Jewish Services:     Active Member of Clubs or Organizations:     Attends Club or Organization Meetings:     Marital Status:         Surgical Hx    Past Surgical History:   Procedure Laterality Date    HX HEENT Right 2001    cataract removal    HX ORTHOPAEDIC Right 7/24/15    excision part of second toe    VASCULAR SURGERY PROCEDURE UNLIST Right 4/24/2015    RIGHT POPLITEAL-TIBIAL BYPASS - by Dr Sophy Sam  /77 (BP 1 Location: Right upper arm, BP Patient Position: Sitting, BP Cuff Size: Adult)   Pulse 80   Temp 98 °F (36.7 °C) (Oral)   Resp 20   Ht 6' 2\" (1.88 m)   Wt 237 lb 6.4 oz (107.7 kg)   SpO2 99% Comment: room air   BMI 30.48 kg/m²        ROS    Review of Systems   Constitutional: Negative. Negative for chills and fever. HENT: Negative. Negative for congestion, ear discharge, hearing loss, nosebleeds and tinnitus. Eyes: Negative. Negative for blurred vision, double vision, photophobia and pain. Respiratory: Negative. Negative for cough, hemoptysis and sputum production. Cardiovascular: Negative. Negative for chest pain and palpitations. Gastrointestinal: Negative. Negative for heartburn, nausea and vomiting. Genitourinary: Negative. Negative for dysuria, frequency and urgency. Musculoskeletal: Positive for joint pain. Negative for back pain and myalgias. Skin: Negative. Neurological: Negative. Negative for dizziness, tingling, weakness and headaches. Endo/Heme/Allergies: Negative. Psychiatric/Behavioral: Negative. Negative for depression and suicidal ideas. The patient does not have insomnia. All other systems reviewed and are negative. Physical Exam      Physical Exam  Vitals and nursing note reviewed. Constitutional:       General: He is not in acute distress. Appearance: Normal appearance. He is obese. He is not ill-appearing, toxic-appearing or diaphoretic. HENT:      Head: Normocephalic and atraumatic. Right Ear: External ear normal. There is no impacted cerumen. Left Ear: External ear normal. There is no impacted cerumen. Nose: Nose normal. No congestion or rhinorrhea. Mouth/Throat:      Mouth: Mucous membranes are moist.      Pharynx: Oropharynx is clear. No oropharyngeal exudate or posterior oropharyngeal erythema. Eyes:      General: No scleral icterus. Right eye: No discharge. Left eye: No discharge. Extraocular Movements: Extraocular movements intact. Conjunctiva/sclera: Conjunctivae normal.   Neck:      Vascular: No carotid bruit. Cardiovascular:      Rate and Rhythm: Normal rate and regular rhythm. Pulses: Normal pulses. Heart sounds: Normal heart sounds. No murmur heard. No friction rub. No gallop. Pulmonary:      Effort: Pulmonary effort is normal. No respiratory distress. Breath sounds: Normal breath sounds. No stridor. No wheezing, rhonchi or rales. Chest:      Chest wall: No tenderness. Abdominal:      General: Abdomen is flat. There is no distension. Palpations: Abdomen is soft. There is no mass. Tenderness: There is no abdominal tenderness. There is no right CVA tenderness, left CVA tenderness, guarding or rebound. Hernia: No hernia is present. Musculoskeletal:         General: No swelling, tenderness, deformity or signs of injury. Normal range of motion. Cervical back: Normal range of motion and neck supple. No rigidity. No muscular tenderness. Right lower leg: No edema.       Left lower leg: No edema. Lymphadenopathy:      Cervical: No cervical adenopathy. Skin:     General: Skin is warm. Capillary Refill: Capillary refill takes 2 to 3 seconds. Coloration: Skin is not jaundiced or pale. Findings: No bruising, erythema, lesion or rash. Neurological:      General: No focal deficit present. Mental Status: He is alert and oriented to person, place, and time. Mental status is at baseline. Cranial Nerves: No cranial nerve deficit. Sensory: No sensory deficit. Motor: No weakness. Coordination: Coordination normal.      Gait: Gait normal.      Deep Tendon Reflexes: Reflexes normal.   Psychiatric:         Mood and Affect: Mood normal.         Behavior: Behavior normal.         Thought Content: Thought content normal.         Judgment: Judgment normal.          Assessment/Plan    Diagnoses and all orders for this visit:    1. Encounter for removal of sutures    2. Chin laceration, subsequent encounter         Health Maintenance Items reviewed with patient as noted. 3 suture (s) were removed by  MD without difficulty. Wound edges were well approximated. Steri-strips were not used. There was not evidence of infection. Patient did tolerate well.

## 2021-06-21 ENCOUNTER — TRANSCRIBE ORDER (OUTPATIENT)
Dept: GENERAL RADIOLOGY | Age: 76
End: 2021-06-21

## 2021-06-21 ENCOUNTER — HOSPITAL ENCOUNTER (OUTPATIENT)
Dept: MRI IMAGING | Age: 76
Discharge: HOME | End: 2021-06-21
Attending: ORTHOPAEDIC SURGERY
Payer: MEDICARE

## 2021-06-21 ENCOUNTER — HOSPITAL ENCOUNTER (OUTPATIENT)
Dept: GENERAL RADIOLOGY | Age: 76
Discharge: HOME | End: 2021-06-21
Attending: ORTHOPAEDIC SURGERY
Payer: MEDICARE

## 2021-06-21 DIAGNOSIS — T15.00XD: ICD-10-CM

## 2021-06-21 DIAGNOSIS — S43.004A UNSPECIFIED DISLOCATION OF RIGHT SHOULDER JOINT, INITIAL ENCOUNTER: ICD-10-CM

## 2021-06-21 DIAGNOSIS — T15.00XD: Primary | ICD-10-CM

## 2021-06-21 PROCEDURE — 70030 X-RAY EYE FOR FOREIGN BODY: CPT

## 2021-06-21 PROCEDURE — 73221 MRI JOINT UPR EXTREM W/O DYE: CPT

## 2021-06-22 ENCOUNTER — TELEPHONE (OUTPATIENT)
Dept: FAMILY MEDICINE CLINIC | Age: 76
End: 2021-06-22

## 2021-07-06 ENCOUNTER — HOSPITAL ENCOUNTER (OUTPATIENT)
Dept: PHYSICAL THERAPY | Age: 76
Discharge: HOME | End: 2021-07-06
Payer: MEDICARE

## 2021-07-06 PROCEDURE — 97110 THERAPEUTIC EXERCISES: CPT

## 2021-07-06 NOTE — PROGRESS NOTES
PT DAILY TREATMENT NOTE - [facility]     Patient Name: Page Hashimoto  Date:2021  : 1945  [x]  Patient  Verified  Payor: Lcuho Patel / Plan: [address] / Product Type: [employer] Medicare /    Treatment Area: Pain in right shoulder [M25.511]  Sprain of right rotator cuff capsule, initial encounter [I42.802Y]   Next MD APPT:   In time:0920am  Out time:1000am  Total Treatment Time (min): 40  Total Timed Codes (min): 40  1:1 Treatment Time ( [address] only): 40   Visit #:     SUBJECTIVE  Pain Level (0-10 scale) pre treatment: 0/10 Pain Level (0-10 scale) post treatment: 0/10  Any medication changes, allergies to medications, adverse drug reactions, diagnosis change, or new procedure performed?:   [] No    [] Yes (see summary sheet for update)  Subjective functional status/changes:   [] No changes reported  Pt had MRI  and follow up with orthopedic . MRI showing multiple rotator cuff tears, humeral head subluxed superiorly, mild OA, likely torn biceps long head. Pt was told his options were total shoulder arthroplasty or trial of physical therapy. Pt states he wants to avoid surgery if possible so opted to try PT first. His next follow up is . MRI results:   1. Chronic supraspinatus and infraspinatus tendon tears with associated muscle  atrophy. 2. Humeral head is subluxed superiorly. 3. At least mild shoulder joint osteoarthritis. 4. Proximal biceps long head tendon is poorly seen, status indeterminate,  potentially torn and retracted. 5. Large joint effusion with likely synovitis.     OBJECTIVE  40 min Therapeutic Exercise:  [x] See flow sheet :   Rationale: increase ROM and increase strength to improve the patients ability to lift the R arm    With   [x] TE   [] TA   [] Neuro   [] SC   [] other: Patient Education: [x] Review HEP    [] Progressed/Changed HEP based on:   [] positioning   [] body mechanics   [] transfers   [] Use of heat/ice [x] other: reviewed MRI results and educated on diagnosis         Other Objective/Functional Measures: instability R GH joint with ROM exercise, note increased elevation on R side  Decrease thoracic extension ROM but PA mobs upper t-spine WNL  TUG score 14 seconds - WNL    ASSESSMENT/Changes in Function:   Pt returns to PT following orthopedic follow up and MRI. Pt has multiple rotator cuff tears and possible long head bicep tear. Superior subluxation and instability related to torn supraspinatus and infraspinatus tendons. Focused on gladis-scapular strengthening, postural correction, and AAROM today. Noted instability, shoulder elevation, poor joint mechanics with exercises. Pt needed cueing to slow down with exercises and for technique. He was given an initial HEP. Pt will return to orthopedic 7/30 to discuss improvements with conservative management vs surgical options. Pt was told he would need a total shoulder arthroplasty. Patient will continue to benefit from skilled PT services to modify and progress therapeutic interventions, address ROM deficits, address strength deficits, analyze and address soft tissue restrictions, analyze and cue movement patterns and assess and modify postural abnormalities to attain remaining goals. GOALS/Progress towards goals:  Short Term Goals: To be accomplished in 2-4 treatments. 1. Patient will be independent with his HEP to progress with POC. 2. Patient pain will decreased to <4/10 with shoulder movement   Long Term Goals: To be accomplished in 16  treatments. 1. Patient will gain 10-15 deg of Shoulder AROM in all directions   2. Patient will gain 1/2 to 1 grade of strength in R shoulder   3.  Patient will report ease with getting dress and doing his morning ADLs    PLAN  []  Upgrade activities as tolerated     [x]  Continue plan of care  [x]  Update interventions per flow sheet       []  Discharge due to:_  []  Other:_      Lori Valentino, PT, DPT 7/6/2021

## 2021-07-08 ENCOUNTER — HOSPITAL ENCOUNTER (OUTPATIENT)
Dept: PHYSICAL THERAPY | Age: 76
Discharge: HOME | End: 2021-07-08
Payer: MEDICARE

## 2021-07-08 PROCEDURE — 97110 THERAPEUTIC EXERCISES: CPT

## 2021-07-08 PROCEDURE — 97112 NEUROMUSCULAR REEDUCATION: CPT

## 2021-07-08 NOTE — PROGRESS NOTES
PT DAILY TREATMENT NOTE - [facility]     Patient Name: Page Hashimoto  Date:2021  : 1945  [x]  Patient  Verified  Payor: [place] [employer] / Plan: [address] / Product Type: [employer] Medicare /    Treatment Area: Pain in right shoulder [M25.511]  Sprain of right rotator cuff capsule, initial encounter [V16.181K]   Next MD APPT:   In time:0938am  Out time: 1003am  Total Treatment Time (min): 25  Total Timed Codes (min): 25  1:1 Treatment Time ([facility] only): 25   Visit #: 3 /16    SUBJECTIVE  Pain Level (0-10 scale) pre treatment: 0/10 Pain Level (0-10 scale) post treatment: 0/10  Any medication changes, allergies to medications, adverse drug reactions, diagnosis change, or new procedure performed?:   [] No    [] Yes (see summary sheet for update)  Subjective functional status/changes:   [x] No changes reported   Pt arriving late. OBJECTIVE  17 min Therapeutic Exercise:  [x] See flow sheet :   Rationale: increase ROM and increase strength to improve the patients ability to lift the R arm  8 min Neuromuscular Re-education:  [x]  See flow sheet :   Rationale: increase strength and improve coordination  to improve the patients ability to lift the R arm    With   [x] TE   [] TA   [] Neuro   [] SC   [] other: Patient Education: [x] Review HEP    [] Progressed/Changed HEP based on:   [] positioning   [] body mechanics   [] transfers   [] Use of heat/ice    [] other:          Other Objective/Functional Measures:   No GH movement with flexion only scapular elevation    ASSESSMENT/Changes in Function:   Focused on gladis-scapular stabilization and postural exercises. Unable to perform any scapular retraction in prone. Significant tactile cueing needed in standing and sidelying position for retraction/depression but improved scapular mobility noted at end of session. No active GH movement noted.  Language barrier  Patient will continue to benefit from skilled PT services to modify and progress therapeutic interventions, address ROM deficits, address strength deficits, analyze and address soft tissue restrictions, analyze and cue movement patterns and assess and modify postural abnormalities to attain remaining goals. GOALS/Progress towards goals:  Short Term Goals: To be accomplished in 2-4 treatments. 1. Patient will be independent with his HEP to progress with POC. [] Met [] Not met [] Partially met   2. Patient pain will decreased to <4/10 with shoulder movement    [] Met [] Not met [] Partially met   Long Term Goals: To be accomplished in 16  treatments. 1. Patient will gain 10-15 deg of Shoulder AROM in all directions    [] Met [] Not met [] Partially met   2. Patient will gain 1/2 to 1 grade of strength in R shoulder    [] Met [] Not met [] Partially met   3.  Patient will report ease with getting dress and doing his morning ADLs   [] Met [] Not met [] Partially met     PLAN  []  Upgrade activities as tolerated     [x]  Continue plan of care  [x]  Update interventions per flow sheet       []  Discharge due to:_  []  Other:_      Hortencia Skiff, PT, DPT 7/8/2021

## 2021-07-12 ENCOUNTER — HOSPITAL ENCOUNTER (OUTPATIENT)
Dept: PHYSICAL THERAPY | Age: 76
Discharge: HOME | End: 2021-07-12
Payer: MEDICARE

## 2021-07-12 PROCEDURE — 97110 THERAPEUTIC EXERCISES: CPT

## 2021-07-12 NOTE — PROGRESS NOTES
PT DAILY TREATMENT NOTE - [facility]     Patient Name: Tristan Finch  Date:2021  : 1945  [x]  Patient  Verified  Payor: Sharee Zhong / Plan: [address] / Product Type: [employer] /    Treatment Area: Pain in right shoulder [M25.511]  Sprain of right rotator cuff capsule, initial encounter [F06.389G]   Next MD APPT:   In time:0948am  Out time: 1030 am  Total Treatment Time (min): 40  Total Timed Codes (min): 40  1:1 Treatment Time (MC only): 40   Visit #: 3 /16    SUBJECTIVE  Pain Level (0-10 scale) pre treatment: 0/10 unless he  Moves the arm Pain Level (0-10 scale) post treatment: 0/10 unless he moves the arm. 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 OBJECTIVE  40 min Therapeutic Exercise:  [x] See flow sheet :   Rationale: increase ROM and increase strength to improve the patients ability to lift the R arm   min Neuromuscular Re-education:  [x]  See flow sheet :   Rationale: increase strength and improve coordination  to improve the patients ability to lift the R arm    With   [x] TE   [] TA   [] Neuro   [] SC   [] other: Patient Education: [x] Review HEP    [] Progressed/Changed HEP based on:   [] positioning   [] body mechanics   [] transfers   [] Use of heat/ice    [] other:          Other Objective/Functional Measures:   Reports some discomfort with pullys and required AA and verbal/tactile cues with prone scapula retraction and supine SA.   No GH movement with flexion only scapular elevation    ASSESSMENT/Changes in Function:   Patient was educated about his RC tear and MRI results were printed from his chart, he was educated bout current roll of PT and was given a second opinion for a MD to assess his shoulder as per his request. [facility] continue to focus on  gladis-scapular stabilization and postural exercises. Unable to perform any scapular retraction in prone required AA. Significant tactile cueing needed in standing and sidelying position for retraction/depression but improved scapular mobility noted at end of session. No active GH movement noted. Patient will continue to benefit from skilled PT services to modify and progress therapeutic interventions, address ROM deficits, address strength deficits, analyze and address soft tissue restrictions, analyze and cue movement patterns and assess and modify postural abnormalities to attain remaining goals. GOALS/Progress towards goals:  Short Term Goals: To be accomplished in 2-4 treatments. 1. Patient will be independent with his HEP to progress with POC. [] Met [] Not met [] Partially met   2. Patient pain will decreased to <4/10 with shoulder movement    [] Met [] Not met [] Partially met   Long Term Goals: To be accomplished in 16  treatments. 1. Patient will gain 10-15 deg of Shoulder AROM in all directions    [] Met [] Not met [] Partially met   2. Patient will gain 1/2 to 1 grade of strength in R shoulder    [] Met [] Not met [] Partially met   3.  Patient will report ease with getting dress and doing his morning ADLs   [] Met [] Not met [] Partially met     PLAN  []  Upgrade activities as tolerated     [x]  Continue plan of care  [x]  Update interventions per flow sheet       []  Discharge due to:_  []  Other:_      Annie Koch, PT, DPT 7/12/2021

## 2021-07-15 ENCOUNTER — HOSPITAL ENCOUNTER (OUTPATIENT)
Dept: PHYSICAL THERAPY | Age: 76
Discharge: HOME | End: 2021-07-15
Payer: MEDICARE

## 2021-07-15 PROCEDURE — 97110 THERAPEUTIC EXERCISES: CPT

## 2021-07-15 NOTE — PROGRESS NOTES
PT DAILY TREATMENT NOTE - [facility]     Patient Name: Hiwot Baker  Date:7/15/2021  : 1945  [x]  Patient  Verified  Payor: Cindy Marion / Plan: [address] / Product Type: [employer] Medicare /    Treatment Area: Pain in right shoulder [M25.511]  Sprain of right rotator cuff capsule, initial encounter [F14.399W]   Next MD APPT:   In time:0953am  Out time: 1129am  Total Treatment Time (min): 36  Total Timed Codes (min): 36  1:1 Treatment Time (MC only): 36   Visit #:     SUBJECTIVE  Pain Level (0-10 scale) pre treatment: 0/10 unless he  Moves the arm Pain Level (0-10 scale) post treatment: 0/10 unless he moves the arm. Any medication changes, allergies to medications, adverse drug reactions, diagnosis change, or new procedure performed?:   [] No    [] Yes (see summary sheet for update)  Subjective functional status/changes:   [x] No changes reported   Pt is going to get second opinion for shoulder surgery options  OBJECTIVE  36 min Therapeutic Exercise:  [x] See flow sheet :   Rationale: increase ROM and increase strength to improve the patients ability to lift the R arm   min Neuromuscular Re-education:  [x]  See flow sheet :   Rationale: increase strength and improve coordination  to improve the patients ability to lift the R arm    With   [x] TE   [] TA   [] Neuro   [] SC   [] other: Patient Education: [x] Review HEP    [] Progressed/Changed HEP based on:   [] positioning   [] body mechanics   [] transfers   [] Use of heat/ice    [] other:          Other Objective/Functional Measures:   + drop arm  No GH movement with flexion only scapular elevation    ASSESSMENT/Changes in Function:   Pt verbalizing understanding of benefits of surgery for long term prognosis and limits of therapy for significant RTC tear. Pt is going to get a second opinion. Continues with significant instability, anterior tipping and scap winging with exercises.  Unable to move into elevation with UT shrug. Patient will continue to benefit from skilled PT services to modify and progress therapeutic interventions, address ROM deficits, address strength deficits, analyze and address soft tissue restrictions, analyze and cue movement patterns and assess and modify postural abnormalities to attain remaining goals. GOALS/Progress towards goals:  Short Term Goals: To be accomplished in 2-4 treatments. 1. Patient will be independent with his HEP to progress with POC. [] Met [x] Not met [] Partially met  (stays busy working but is not doing exercises provided)  2. Patient pain will decreased to <4/10 with shoulder movement    [] Met [x] Not met [] Partially met   Long Term Goals: To be accomplished in 16  treatments. 1. Patient will gain 10-15 deg of Shoulder AROM in all directions    [] Met [] Not met [] Partially met   2. Patient will gain 1/2 to 1 grade of strength in R shoulder    [] Met [] Not met [] Partially met   3.  Patient will report ease with getting dress and doing his morning ADLs   [] Met [] Not met [] Partially met     PLAN  []  Upgrade activities as tolerated     [x]  Continue plan of care  [x]  Update interventions per flow sheet       []  Discharge due to:_  []  Other:_      Regis Gallo, PT, DPT 7/15/2021

## 2021-07-20 ENCOUNTER — HOSPITAL ENCOUNTER (OUTPATIENT)
Dept: PHYSICAL THERAPY | Age: 76
Discharge: HOME | End: 2021-07-20
Payer: MEDICARE

## 2021-07-20 PROCEDURE — 97110 THERAPEUTIC EXERCISES: CPT

## 2021-07-20 NOTE — PROGRESS NOTES
[address], [address] [place] [address]  Ph: [phone]    Fax: [phone]  Therapy Progress Report  Name: Addy Moctezuma   :    MD: Jazmin Allen MD     Treatment Diagnosis: Pain in right shoulder [M25.511]  Sprain of right rotator cuff capsule, initial encounter [T48.438B]  Start of Care: 21 Visits from Start of Care: 6  Missed Visits: 0  Problem List/Impairments: pain affecting function, decrease ROM, decrease strength, decrease ADL/ functional abilitiies and decrease flexibility/ joint mobility  Summary of Care: Pt continues to report some improvement in his ROM but no demonstrable difference in AROM measures in clinic. Pt continues to hike the R shoulder to raise the arm and we note a positive R drop arm test. Continues with significant instability, anterior tipping and scap winging with exercises. Examination findings are consistent with pt's MRI results R RTC tear. Pt verbalizing understanding of benefits of surgery for long term prognosis and limits of therapy for significant RTC tear. Pt is going to get a second opinion with MD regarding surgical options. Treatment in clinic has focused on gladis-scapular strength and posture, ROM/stretching as tolerated without pain. SHOULDER  AROM   PROM   R L R L   Flexion 25     Extension       Abduction 30     Adduction       IR       ER        Additional Comments: no significant ROM without UT compensation in standing    GOALS/Progress towards goals:  Short Term Goals: To be accomplished in 2-4 treatments. 1. Patient will be independent with his HEP to progress with POC. []? Met [x]? Not met []? Partially met  (stays busy working but is not doing exercises provided)  2. Patient pain will decreased to <4/10 with shoulder movement               []? Met [x]? Not met []? Partially met   [address] be accomplished in 16  treatments.   1. Patient will gain 10-15 deg of Shoulder AROM in all directions               []? Met [x]? Not met []? Partially met   2. Patient will gain 1/2 to 1 grade of strength in R shoulder               []? Met [x]? Not met []? Partially met   3. Patient will report ease with getting dress and doing his morning ADLs              []? Met []? Not met []? Partially met      Recommendations: Recommended pt get second opinion with orthopedic surgeon and discussed limits of therapy due to RTC tear. Frequency / Duration: Patient to be seen 2 times per week for 16 treatments. Certification Period (if applicable): 3/93-7/09  Treatment Plan may include any combination of the following: Therapeutic exercise, Neuromuscular re-education, Physical agent/modality, Manual therapy, Patient education and Self Care training  Patient/ Caregiver education and instruction: self care, exercises and other education on diagnosis/MRI results/prognosis  [x]  Plan of care has been reviewed with PTA. Diane Sánchez, PT, DPT 7/20/2021     ________________________________________________________________________     Please retain this original for your records.

## 2021-07-20 NOTE — PROGRESS NOTES
PT DAILY TREATMENT NOTE - [facility]     Patient Name: Malorie Apple  Date:2021  : 1945  [x]  Patient  Verified  Payor: Andrew Getnile / Plan: [address] / Product Type: [employer] Medicare /    Treatment Area: Pain in right shoulder [M25.511]  Sprain of right rotator cuff capsule, initial encounter [H17.237V]   Next MD APPT:   In time:0838am  Out time: 0910am  Total Treatment Time (min): 32  Total Timed Codes (min): 32  1:1 Treatment Time (MC only): 32   Visit #:     SUBJECTIVE  Pain Level (0-10 scale) pre treatment: 0/10  Pain Level (0-10 scale) post treatment: 0/10   Any medication changes, allergies to medications, adverse drug reactions, diagnosis change, or new procedure performed?:   [] No    [] Yes (see summary sheet for update)  Subjective functional status/changes:   [x] No changes reported   Pt states he has an appt at [facility] to get a second opinion from orthopedic on . He also has a follow up with his first orthopedic referral . OBJECTIVE  32 min Therapeutic Exercise:  [x] See flow sheet :   Rationale: increase ROM and increase strength to improve the patients ability to lift the R arm   min Neuromuscular Re-education:  [x]  See flow sheet :   Rationale: increase strength and improve coordination  to improve the patients ability to lift the R arm    With   [x] TE   [] TA   [] Neuro   [] SC   [] other: Patient Education: [x] Review HEP    [] Progressed/Changed HEP based on:   [] positioning   [] body mechanics   [] transfers   [] Use of heat/ice    [] other:          Other Objective/Functional Measures:   + drop arm  No GH movement with flexion only scapular elevation    ASSESSMENT/Changes in Function:   Pt continues to report some improvement in his ROM but no demonstrable difference in AROM measures in clinic.  Pt continues to hike the R shoulder to raise the arm and we note a positive R drop arm test. Continues with significant instability, anterior tipping and scap winging with exercises. Examination findings are consistent with pt's MRI results R RTC tear. Pt verbalizing understanding of benefits of surgery for long term prognosis and limits of therapy for significant RTC tear. Pt is going to get a second opinion with MD regarding surgical options. Patient will continue to benefit from skilled PT services to modify and progress therapeutic interventions, address ROM deficits, address strength deficits, analyze and address soft tissue restrictions, analyze and cue movement patterns and assess and modify postural abnormalities to attain remaining goals. GOALS/Progress towards goals:  Short Term Goals: To be accomplished in 2-4 treatments. 1. Patient will be independent with his HEP to progress with POC. [] Met [x] Not met [] Partially met  (stays busy working but is not doing exercises provided)  2. Patient pain will decreased to <4/10 with shoulder movement    [] Met [x] Not met [] Partially met   Long Term Goals: To be accomplished in 16  treatments. 1. Patient will gain 10-15 deg of Shoulder AROM in all directions    [] Met [x] Not met [] Partially met   2. Patient will gain 1/2 to 1 grade of strength in R shoulder    [] Met [x] Not met [] Partially met   3.  Patient will report ease with getting dress and doing his morning ADLs   [] Met [] Not met [] Partially met      PLAN  []  Upgrade activities as tolerated     [x]  Continue plan of care  [x]  Update interventions per flow sheet       []  Discharge due to:_  []  Other:_      Jodie Mason, PT, DPT 7/20/2021

## 2021-07-22 ENCOUNTER — HOSPITAL ENCOUNTER (OUTPATIENT)
Dept: PHYSICAL THERAPY | Age: 76
Discharge: HOME | End: 2021-07-22
Payer: MEDICARE

## 2021-07-22 PROCEDURE — 97110 THERAPEUTIC EXERCISES: CPT

## 2021-07-22 NOTE — PROGRESS NOTES
PT DAILY TREATMENT NOTE - [facility]     Patient Name: Lillian Adkins  Date:2021  : 1945  [x]  Patient  Verified  Payor: [address] [place] / Plan: [address] / Product Type: [employer] /    Treatment Area: Pain in right shoulder [M25.511]  Sprain of right rotator cuff capsule, initial encounter [K46.055X]   Next MD APPT:   In time:0848am  Out time: 0917am  Total Treatment Time (min): 29  Total Timed Codes (min): 29  1:1 Treatment Time ( only):    Visit #:     SUBJECTIVE  Pain Level (0-10 scale) pre treatment: 0/10  Pain Level (0-10 scale) post treatment: 0/10   Any medication changes, allergies to medications, adverse drug reactions, diagnosis change, or new procedure performed?:   [] No    [] Yes (see summary sheet for update)  Subjective functional status/changes:   [x] No changes reported   Pt arriving almost 20 mins late. Pt states he has an appt at [facility] to get a second opinion from orthopedic on . He also has a follow up with his first orthopedic referral . Pt has no pain. No pain when lifting arm but still unable to raise it. OBJECTIVE  29 min Therapeutic Exercise:  [x] See flow sheet :   Rationale: increase ROM and increase strength to improve the patients ability to lift the R arm   min Neuromuscular Re-education:  [x]  See flow sheet :   Rationale: increase strength and improve coordination  to improve the patients ability to lift the R arm    With   [x] TE   [] TA   [] Neuro   [] SC   [] other: Patient Education: [x] Review HEP    [] Progressed/Changed HEP based on:   [] positioning   [] body mechanics   [] transfers   [] Use of heat/ice    [] other:          Other Objective/Functional Measures:   + drop arm  No GH movement with flexion only scapular elevation    ASSESSMENT/Changes in Function:   Continues without significant change in ROM measures. He is improving with exercise technique and scapular retraction. Still significant difficulty in prone. Pt will wait until he sees orthopedic surgeons next week and will call back  Patient will continue to benefit from skilled PT services to modify and progress therapeutic interventions, address ROM deficits, address strength deficits, analyze and address soft tissue restrictions, analyze and cue movement patterns and assess and modify postural abnormalities to attain remaining goals. GOALS/Progress towards goals:  Short Term Goals: To be accomplished in 2-4 treatments. 1. Patient will be independent with his HEP to progress with POC. [] Met [x] Not met [] Partially met  (stays busy working but is not doing exercises provided)  2. Patient pain will decreased to <4/10 with shoulder movement    [] Met [x] Not met [] Partially met   Long Term Goals: To be accomplished in 16  treatments. 1. Patient will gain 10-15 deg of Shoulder AROM in all directions    [] Met [x] Not met [] Partially met   2. Patient will gain 1/2 to 1 grade of strength in R shoulder    [] Met [x] Not met [] Partially met   3.  Patient will report ease with getting dress and doing his morning ADLs   [] Met [] Not met [] Partially met      PLAN  []  Upgrade activities as tolerated     [x]  Continue plan of care  [x]  Update interventions per flow sheet       []  Discharge due to:_  [x]  Other:_  Awaiting decision after orthopedic surgeon appts next week on pt's plan/surgical options    David Vargas, PT, DPT 7/22/2021

## 2021-08-26 NOTE — PROGRESS NOTES
[address]  Ph: [phone]  Fax: [phone]    Discharge Summary 2-15    Patient name: Jose F Laguerre  :   Provider#: [id number]  Referral source: Arik Louise MD      Medical/Treatment Diagnosis: Pain in right shoulder [M25.511]  Sprain of right rotator cuff capsule, initial encounter [J94.441I]     Prior Hospitalization: see medical history     Comorbidities: See Plan of Care  Prior Level of Function: See Plan of Care  Medications: Verified on Patient Summary List  Start of Care: 21   Onset Date:21   Visits from Start of Care: 7  Missed Visits: 0  Certification Period : 21 to 21    Summary of Care:  Pt was seen for 7 treatment sessions with focus on scapular strengthening and ROM, stretching. The pt's presentation was consistent with MRI results of large RTC tear. The pt did not progress with conservative management and decided to pursue surgical options. Pt intended to get a second opinion from another orthopedic to better understand options. Pt has not been seen since last appointment 21 so will be discharged from therapy at this time. GOALS/Progress towards goals:  Short Term Goals: To be accomplished in 2-4 treatments. 1. Patient will be independent with his HEP to progress with POC. []? Met [x]? Not met []? Partially met  (stays busy working but is not doing exercises provided)  2. Patient pain will decreased to <4/10 with shoulder movement               []? Met [x]? Not met []? Partially met   [address] be accomplished in 16  treatments. 1. Patient will gain 10-15 deg of Shoulder AROM in all directions               []? Met [x]? Not met []? Partially met   2. Patient will gain 1/2 to 1 grade of strength in R shoulder               []? Met [x]? Not met []? Partially met   3.  Patient will report ease with getting dress and doing his morning ADLs []? Met []? Not met []?  Partially met      RECOMMENDATIONS:  [x]Discontinue therapy: []Patient has reached or is progressing toward set goals and is independent with HEP     []Patient is non-compliant or has abdicated     [x]Due to lack of appreciable progress towards set goals     []Other  Brayan [place], PT, DPT 8/26/2021

## 2021-09-03 DIAGNOSIS — M10.9 ACUTE GOUTY ARTHRITIS: Primary | ICD-10-CM

## 2021-09-15 DIAGNOSIS — M10.9 ACUTE GOUTY ARTHRITIS: Primary | ICD-10-CM

## 2021-09-15 RX ORDER — HYDROCHLOROTHIAZIDE 25 MG/1
TABLET ORAL
Qty: 90 TABLET | Refills: 0 | Status: SHIPPED | OUTPATIENT
Start: 2021-09-15

## 2021-09-15 RX ORDER — ALLOPURINOL 100 MG/1
100 TABLET ORAL DAILY
Qty: 30 TABLET | Refills: 2 | Status: SHIPPED | OUTPATIENT
Start: 2021-09-15

## 2021-09-15 RX ORDER — COLCHICINE 0.6 MG/1
0.6 TABLET ORAL 2 TIMES DAILY
Qty: 30 TABLET | Refills: 0 | Status: SHIPPED | OUTPATIENT
Start: 2021-09-15

## 2021-09-15 RX ORDER — COLCHICINE 0.6 MG/1
0.6 TABLET ORAL 2 TIMES DAILY
Qty: 30 TABLET | Refills: 0 | Status: CANCELLED | OUTPATIENT
Start: 2021-09-15

## 2021-09-24 RX ORDER — AMLODIPINE BESYLATE 10 MG/1
TABLET ORAL
Qty: 90 TABLET | Refills: 0 | Status: SHIPPED | OUTPATIENT
Start: 2021-09-24

## 2021-09-28 ENCOUNTER — TELEPHONE (OUTPATIENT)
Dept: FAMILY MEDICINE CLINIC | Age: 76
End: 2021-09-28

## 2021-09-28 RX ORDER — ALBUTEROL SULFATE 90 UG/1
AEROSOL, METERED RESPIRATORY (INHALATION)
Qty: 9 G | Refills: 0 | Status: SHIPPED | OUTPATIENT
Start: 2021-09-28 | End: 2021-12-02

## 2021-10-14 ENCOUNTER — OFFICE VISIT (OUTPATIENT)
Dept: FAMILY MEDICINE CLINIC | Age: 76
End: 2021-10-14
Payer: MEDICARE

## 2021-10-14 VITALS
DIASTOLIC BLOOD PRESSURE: 84 MMHG | SYSTOLIC BLOOD PRESSURE: 136 MMHG | TEMPERATURE: 97.8 F | BODY MASS INDEX: 29.13 KG/M2 | WEIGHT: 227 LBS | HEIGHT: 74 IN | OXYGEN SATURATION: 99 % | HEART RATE: 85 BPM | RESPIRATION RATE: 16 BRPM

## 2021-10-14 DIAGNOSIS — K40.90 LEFT INGUINAL HERNIA: ICD-10-CM

## 2021-10-14 DIAGNOSIS — M10.9 GOUTY ARTHROPATHY: ICD-10-CM

## 2021-10-14 DIAGNOSIS — E78.2 MIXED HYPERLIPIDEMIA: ICD-10-CM

## 2021-10-14 DIAGNOSIS — F17.200 TOBACCO DEPENDENCE SYNDROME: ICD-10-CM

## 2021-10-14 DIAGNOSIS — I11.9 MALIGNANT HYPERTENSIVE HEART DISEASE WITHOUT HEART FAILURE: Primary | ICD-10-CM

## 2021-10-14 DIAGNOSIS — Z71.6 TOBACCO ABUSE COUNSELING: ICD-10-CM

## 2021-10-14 PROCEDURE — G8417 CALC BMI ABV UP PARAM F/U: HCPCS | Performed by: FAMILY MEDICINE

## 2021-10-14 PROCEDURE — 99214 OFFICE O/P EST MOD 30 MIN: CPT | Performed by: FAMILY MEDICINE

## 2021-10-14 PROCEDURE — 1101F PT FALLS ASSESS-DOCD LE1/YR: CPT | Performed by: FAMILY MEDICINE

## 2021-10-14 PROCEDURE — G8536 NO DOC ELDER MAL SCRN: HCPCS | Performed by: FAMILY MEDICINE

## 2021-10-14 PROCEDURE — G8510 SCR DEP NEG, NO PLAN REQD: HCPCS | Performed by: FAMILY MEDICINE

## 2021-10-14 PROCEDURE — G8427 DOCREV CUR MEDS BY ELIG CLIN: HCPCS | Performed by: FAMILY MEDICINE

## 2021-10-14 PROCEDURE — 3017F COLORECTAL CA SCREEN DOC REV: CPT | Performed by: FAMILY MEDICINE

## 2021-10-14 NOTE — PROGRESS NOTES
Konstantin Hallman is a 77 y.o. male and presents with Asthma, Medication Refill, and Inguinal Hernia (right side)  . HPI   78 yo AAM with a hx of HTN here on follow up c/o left inguinal pain secondary to an inguinal hernia that he is still postponing doing surgery for  Subjective:  Cardiovascular Review:  The patient has hypertension   Diet and Lifestyle: generally follows a low fat low cholesterol diet, generally follows a low sodium diet, exercises sporadically  Home BP Monitoring: is not measured at home. Pertinent ROS: taking medications as instructed, no medication side effects noted, no TIA's, no chest pain on exertion, no dyspnea on exertion, no swelling of ankles. Review of Systems  Review of Systems   Constitutional: Negative. Negative for chills and fever. HENT: Negative. Negative for congestion, ear discharge, hearing loss, nosebleeds and tinnitus. Eyes: Negative. Negative for blurred vision, double vision, photophobia and pain. Respiratory: Negative. Negative for cough, hemoptysis and sputum production. Cardiovascular: Negative. Negative for chest pain and palpitations. Gastrointestinal: Negative. Negative for heartburn, nausea and vomiting. Genitourinary: Negative. Negative for dysuria, frequency and urgency. Musculoskeletal: Negative. Negative for back pain and myalgias. Skin: Negative. Neurological: Negative. Negative for dizziness, tingling, weakness and headaches. Endo/Heme/Allergies: Negative. Psychiatric/Behavioral: Negative. Negative for depression and suicidal ideas. The patient does not have insomnia. All other systems reviewed and are negative.         Past Medical History:   Diagnosis Date    Allergies     Colitis     Diabetes ([facility] [address]     pt denies he has diabetes    Eye problems     GERD (gastroesophageal reflux disease)     Gout 2012    right foot    History of osteomyelitis     Hx of long term use of blood thinners     plavix    Hypercholesterolemia     Hypertension     Other ill-defined conditions(799.89)     pherpherial vascular disease, foot ulcer     Past Surgical History:   Procedure Laterality Date    HX HEENT Right 2001    cataract removal    HX ORTHOPAEDIC Right 7/24/15    excision part of second toe    VASCULAR SURGERY PROCEDURE UNLIST Right 4/24/2015    RIGHT POPLITEAL-TIBIAL BYPASS - by Dr Christine Connolly     Social History     Socioeconomic History    Marital status:      Spouse name: Not on file    Number of children: Not on file    Years of education: Not on file    Highest education level: Not on file   Tobacco Use    Smoking status: Current Every Day Smoker     Packs/day: 0.50     Years: 55.00     Pack years: 27.50    Smokeless tobacco: Never Used    Tobacco comment: pateint states he is trying to quit   Vaping Use    Vaping Use: Never used   Substance and Sexual Activity    Alcohol use: Yes     Comment: occasional    Drug use: No     Social Determinants of Health     Financial Resource Strain:     Difficulty of Paying Living Expenses:    Food Insecurity:     Worried About Running Out of Food in the Last Year:     Ran Out of Food in the Last Year:    Transportation Needs:     Lack of Transportation (Medical):  Lack of Transportation (Non-Medical):    Physical Activity:     Days of Exercise per Week:     Minutes of Exercise per Session:    Stress:     Feeling of Stress :    Social Connections:     Frequency of Communication with Friends and Family:     Frequency of Social Gatherings with Friends and Family:     Attends Sabianist Services:     Active Member of Clubs or Organizations:     Attends Club or Organization Meetings:     Marital Status:      No family history on file.   Current Outpatient Medications   Medication Sig Dispense Refill    albuterol (PROVENTIL HFA, VENTOLIN HFA, PROAIR HFA) 90 mcg/actuation inhaler INHALE 2 PUFFS BY MOUTH EVERY 6 HOURS AS NEEDED FOR WHEEZING 9 g 0    amLODIPine (NORVASC) 10 mg tablet Take 1 tablet by mouth once daily 90 Tablet 0    allopurinoL (ZYLOPRIM) 100 mg tablet Take 1 Tablet by mouth daily. 30 Tablet 2    hydroCHLOROthiazide (HYDRODIURIL) 25 mg tablet Take 1 tablet by mouth once daily for 90 days 90 Tablet 0    colchicine (Colcrys) 0.6 mg tablet Take 1 Tablet by mouth two (2) times a day. 30 Tablet 0    ammonium lactate (AMLACTIN) 12 % topical cream Apply  to affected area two (2) times a day. rub in to affected area well (Patient taking differently: Apply  to affected area as needed. rub in to affected area well) 280 g 3    Omega-3 Fatty Acids 300 mg cap Take  by mouth.  ibuprofen (MOTRIN) 200 mg tablet Take  by mouth every eight (8) hours as needed for Pain.  losartan (COZAAR) 100 mg tablet Take 100 mg by mouth daily. (Patient not taking: Reported on 10/14/2021)       Allergies   Allergen Reactions    Sulfa (Sulfonamide Antibiotics) Hives and Rash     Pt reports recently started medication but got a rash    Bactrim [Sulfamethoprim Ds] Hives and Itching    Sulfur Dioxide Unknown (comments)       Objective:  Visit Vitals  /84   Pulse 85   Temp 97.8 °F (36.6 °C) (Temporal)   Resp 16   Ht 6' 2\" (1.88 m)   Wt 227 lb (103 kg)   SpO2 99%   BMI 29.15 kg/m²       Physical Exam:   Physical Exam  Vitals and nursing note reviewed. Constitutional:       General: He is not in acute distress. Appearance: Normal appearance. He is obese. He is not ill-appearing, toxic-appearing or diaphoretic. HENT:      Head: Normocephalic and atraumatic. Right Ear: Tympanic membrane, ear canal and external ear normal. There is no impacted cerumen. Left Ear: Tympanic membrane, ear canal and external ear normal. There is no impacted cerumen. Nose: Nose normal. No congestion or rhinorrhea. Mouth/Throat:      Mouth: Mucous membranes are moist.      Pharynx: Oropharynx is clear.  No oropharyngeal exudate or posterior oropharyngeal erythema. Eyes:      General: No scleral icterus. Right eye: No discharge. Left eye: No discharge. Extraocular Movements: Extraocular movements intact. Conjunctiva/sclera: Conjunctivae normal.      Pupils: Pupils are equal, round, and reactive to light. Neck:      Vascular: No carotid bruit. Cardiovascular:      Rate and Rhythm: Normal rate and regular rhythm. Pulses: Normal pulses. Heart sounds: Normal heart sounds. No murmur heard. No friction rub. No gallop. Pulmonary:      Effort: Pulmonary effort is normal. No respiratory distress. Breath sounds: Normal breath sounds. No stridor. No wheezing, rhonchi or rales. Chest:      Chest wall: No tenderness. Abdominal:      General: Abdomen is flat. Bowel sounds are normal. There is no distension. Palpations: Abdomen is soft. There is no mass. Tenderness: There is no abdominal tenderness. There is no right CVA tenderness, left CVA tenderness, guarding or rebound. Hernia: No hernia is present. Musculoskeletal:         General: No swelling, tenderness, deformity or signs of injury. Normal range of motion. Cervical back: Normal range of motion and neck supple. No rigidity. No muscular tenderness. Right lower leg: No edema. Left lower leg: No edema. Lymphadenopathy:      Cervical: No cervical adenopathy. Skin:     General: Skin is warm. Capillary Refill: Capillary refill takes 2 to 3 seconds. Coloration: Skin is not jaundiced or pale. Findings: No bruising, erythema, lesion or rash. Neurological:      General: No focal deficit present. Mental Status: He is alert and oriented to person, place, and time. Mental status is at baseline. Cranial Nerves: No cranial nerve deficit. Sensory: No sensory deficit. Motor: No weakness.       Coordination: Coordination normal.      Gait: Gait normal.      Deep Tendon Reflexes: Reflexes normal.   Psychiatric: Mood and Affect: Mood normal.         Behavior: Behavior normal.         Thought Content: Thought content normal.         Judgment: Judgment normal.             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 Mixed hyperlipidemia  E78.2 272.2    3. Tobacco dependence syndrome  F17.200 305.1    4. Tobacco abuse counseling  Z71.6 V65.42      305.1    5. Left inguinal hernia  K40.90 550.90    6. Gouty arthropathy  M10.9 274.00      No orders of the defined types were placed in this encounter. Cannot display discharge medications since this is not an admission.

## 2021-10-14 NOTE — PROGRESS NOTES
Identified pt with two pt identifiers(name and ). Reviewed record in preparation for visit and have obtained necessary documentation. Chief Complaint   Patient presents with    Asthma    Medication Refill    Inguinal Hernia     right side        Vitals:    10/14/21 0854   BP: 136/84   Pulse: 85   Resp: 16   Temp: 97.8 °F (36.6 °C)   TempSrc: Temporal   SpO2: 99%   Weight: 227 lb (103 kg)   Height: 6' 2\" (1.88 m)   PainSc:   0 - No pain       Health Maintenance Due   Topic    Hepatitis C Screening     DTaP/Tdap/Td series (1 - Tdap)    Colorectal Cancer Screening Combo     Shingrix Vaccine Age 50> (1 of 2)    Low dose CT lung screening     AAA Screening 73-67 YO Male Smoking Patients     Pneumococcal 65+ years (1 of 1 - PPSV23)    Medicare Yearly Exam     Flu Vaccine (1)       Coordination of Care Questionnaire:  :   1) Have you been to an emergency room, urgent care, or hospitalized since your last visit? If yes, where when, and reason for visit? no       2. Have seen or consulted any other health care provider since your last visit? If yes, where when, and reason for visit? NO      Patient is accompanied by self I have received verbal consent from John Uribe to discuss any/all medical information while they are present in the room.

## 2021-11-15 ENCOUNTER — OFFICE VISIT (OUTPATIENT)
Dept: PODIATRY | Age: 76
End: 2021-11-15
Payer: MEDICARE

## 2021-11-15 VITALS
HEART RATE: 50 BPM | BODY MASS INDEX: 29.77 KG/M2 | TEMPERATURE: 96.6 F | SYSTOLIC BLOOD PRESSURE: 165 MMHG | WEIGHT: 232 LBS | HEIGHT: 74 IN | DIASTOLIC BLOOD PRESSURE: 99 MMHG | OXYGEN SATURATION: 97 %

## 2021-11-15 DIAGNOSIS — M10.9 GOUTY ARTHROPATHY: ICD-10-CM

## 2021-11-15 DIAGNOSIS — B35.1 ONYCHOMYCOSIS: Primary | ICD-10-CM

## 2021-11-15 DIAGNOSIS — L85.3 XEROSIS CUTIS: ICD-10-CM

## 2021-11-15 PROCEDURE — G8417 CALC BMI ABV UP PARAM F/U: HCPCS | Performed by: PODIATRIST

## 2021-11-15 PROCEDURE — 3017F COLORECTAL CA SCREEN DOC REV: CPT | Performed by: PODIATRIST

## 2021-11-15 PROCEDURE — G8536 NO DOC ELDER MAL SCRN: HCPCS | Performed by: PODIATRIST

## 2021-11-15 PROCEDURE — 99213 OFFICE O/P EST LOW 20 MIN: CPT | Performed by: PODIATRIST

## 2021-11-15 PROCEDURE — G8427 DOCREV CUR MEDS BY ELIG CLIN: HCPCS | Performed by: PODIATRIST

## 2021-11-15 PROCEDURE — G8432 DEP SCR NOT DOC, RNG: HCPCS | Performed by: PODIATRIST

## 2021-11-15 PROCEDURE — 1101F PT FALLS ASSESS-DOCD LE1/YR: CPT | Performed by: PODIATRIST

## 2021-11-15 NOTE — PROGRESS NOTES
[facility]    Subjective:         Patient is a 77 y.o. male who is being seen as a returning pt for multiple lower extremity complaints. Patient states he is suffering from acute on chronic pain in his feet, rising to the level of 4 out of 10. He states the R>L. He states the colchicine that was rx'ed last visit helped with his pain. He states his uric acid levels have not been drawn recently. He has completed the terbinafine 250 mg p.o. daily for his onychomycosis. He has noticed minimal change in the appearance of his nails. Also states he is utilizing the prescription strength lotion he was prescribed last office visit for his severe dry skin. He states he has noticed great relief in his associated symptoms for his dry skin. He denies any recent trauma. He denies any systemic signs infection. He denies any other pedal complaints    Past Medical History:   Diagnosis Date    Allergies     Colitis     Diabetes ([address]     pt denies he has diabetes    Eye problems     GERD (gastroesophageal reflux disease)     Gout 2012    right foot    History of osteomyelitis     Hx of long term use of blood thinners     plavix    Hypercholesterolemia     Hypertension     Other ill-defined conditions(799.89)     pherpherial vascular disease, foot ulcer     Past Surgical History:   Procedure Laterality Date    HX HEENT Right 2001    cataract removal    HX ORTHOPAEDIC Right 7/24/15    excision part of second toe    VASCULAR SURGERY PROCEDURE UNLIST Right 4/24/2015    RIGHT POPLITEAL-TIBIAL BYPASS - by Dr Peder Holter       No family history on file.    Social History     Tobacco Use    Smoking status: Current Every Day Smoker     Packs/day: 0.50     Years: 55.00     Pack years: 27.50    Smokeless tobacco: Never Used    Tobacco comment: pateint states he is trying to quit   Substance Use Topics    Alcohol use: Yes     Comment: occasional     Allergies   Allergen Reactions    Sulfa (Sulfonamide Antibiotics) Hives and Rash     Pt reports recently started medication but got a rash    Bactrim [Sulfamethoprim Ds] Hives and Itching    Sulfur Dioxide Unknown (comments)     Prior to Admission medications    Medication Sig Start Date End Date Taking? Authorizing Provider   albuterol (PROVENTIL HFA, VENTOLIN HFA, PROAIR HFA) 90 mcg/actuation inhaler INHALE 2 PUFFS BY MOUTH EVERY 6 HOURS AS NEEDED FOR WHEEZING 9/28/21   Ignacia Hinojosa MD   amLODIPine (NORVASC) 10 mg tablet Take 1 tablet by mouth once daily 9/24/21   Ignacia Hinojosa MD   allopurinoL (ZYLOPRIM) 100 mg tablet Take 1 Tablet by mouth daily. 9/15/21   Ignacia Hinojosa MD   hydroCHLOROthiazide (HYDRODIURIL) 25 mg tablet Take 1 tablet by mouth once daily for 90 days 9/15/21   Ignacia Hinojosa MD   colchicine (Colcrys) 0.6 mg tablet Take 1 Tablet by mouth two (2) times a day. 9/15/21   Ignacia Hinojosa MD   ammonium lactate (AMLACTIN) 12 % topical cream Apply  to affected area two (2) times a day. rub in to affected area well  Patient taking differently: Apply  to affected area as needed. rub in to affected area well 12/8/20   Ousmane Henley DPM   Omega-3 Fatty Acids 300 mg cap Take  by mouth. Provider, Historical   ibuprofen (MOTRIN) 200 mg tablet Take  by mouth every eight (8) hours as needed for Pain. Provider, Historical   losartan (COZAAR) 100 mg tablet Take 100 mg by mouth daily. Patient not taking: Reported on 10/14/2021    Provider, Historical       Review of Systems   Constitutional: Negative. HENT: Negative. Eyes: Negative. Respiratory: Negative. Cardiovascular: Negative. Gastrointestinal: Negative. Endocrine: Negative. Genitourinary: Negative. Musculoskeletal: Positive for arthralgias. Allergic/Immunologic: Positive for immunocompromised state. Hematological: Negative. Psychiatric/Behavioral: Negative. All other systems reviewed and are negative.       Objective:     Visit Vitals  BP (!) 165/99 (BP 1 Location: Left upper arm, BP Patient Position: Sitting, BP Cuff Size: Large adult)   Pulse (!) 50   Temp (!) 96.6 °F (35.9 °C) (Temporal)   Ht 6' 2\" (1.88 m)   Wt 232 lb (105.2 kg)   SpO2 97%   BMI 29.79 kg/m²       Physical Exam  Vitals reviewed. Constitutional:       Appearance: He is obese. Cardiovascular:      Pulses:           Dorsalis pedis pulses are 1+ on the right side and 1+ on the left side. Posterior tibial pulses are 1+ on the right side and 1+ on the left side. Pulmonary:      Effort: Pulmonary effort is normal.   Musculoskeletal:      Right lower leg: No edema. Left lower leg: No edema. Right foot: Decreased range of motion. Deformity present. No bunion or Charcot foot. Left foot: Decreased range of motion. Deformity present. No bunion or Charcot foot. Feet:      Right foot:      Protective Sensation: 10 sites tested. 10 sites sensed. Skin integrity: Dry skin present. Toenail Condition: Right toenails are abnormally thick. Fungal disease present. Left foot:      Protective Sensation: 10 sites tested. 10 sites sensed. Skin integrity: Dry skin present. Toenail Condition: Left toenails are abnormally thick. Fungal disease present. Lymphadenopathy:      Lower Body: No right inguinal adenopathy. No left inguinal adenopathy. Skin:     General: Skin is warm. Capillary Refill: Capillary refill takes 2 to 3 seconds. Neurological:      Mental Status: He is alert and oriented to person, place, and time. Psychiatric:         Mood and Affect: Mood and affect normal.         Behavior: Behavior is cooperative. Data Review: No results found for this or any previous visit (from the past 24 hour(s)). Impression:       ICD-10-CM ICD-9-CM    1. Onychomycosis  B35.1 110.1    2. 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  Gouty arthropathy  M10.9 274.00        Recommendation:     Patient seen and evaluated in the office  Discussed and educated patient regarding their current medical condition. Instructed patient to monitor their feet daily, be compliant with all medications and wear supportive shoe gear. Patient to continue with the ammonium lactate 12% lotion to be utilized twice daily on all affected xerotic skin  Patient to thin his nails daily and continue to monitor. If needed, we will rebiopsy of the nails to evaluate if fungal elements remain  An in-depth conversation was had regarding his gouty arthritis. He is to cont with the colchicine and allopurinol as directed        Ravindra Olson, [address] [facility], [address] [place] [address] and Rosanna [address] [place] [address] [address], [address] [place] [address]  O: [phone]  F: [phone]  C: [phone]

## 2021-11-15 NOTE — PROGRESS NOTES
Chief Complaint   Patient presents with    Foot Pain     pt states R foot is tender on bottom     1. Have you been to the ER, urgent care clinic since your last visit? Hospitalized since your last visit? No    2. Have you seen or consulted any other health care providers outside of the [address] [place] [address] since your last visit? Include any pap smears or colon screening.  No  PCP-Dr Tamayo Nurse

## 2021-12-02 RX ORDER — ALBUTEROL SULFATE 90 UG/1
AEROSOL, METERED RESPIRATORY (INHALATION)
Qty: 9 G | Refills: 0 | Status: SHIPPED | OUTPATIENT
Start: 2021-12-02

## 2022-01-06 ENCOUNTER — TELEPHONE (OUTPATIENT)
Dept: FAMILY MEDICINE CLINIC | Age: 77
End: 2022-01-06

## 2022-01-06 NOTE — TELEPHONE ENCOUNTER
Pt recently moved to [facility]. Has not established a PCP there yet. They just moved last week.   Their insurance gavie them the names of podiatrists in [facility] and said that they will need a referral.  Please create a podiatry referral for:    [facility], Dr. Lorraine Morris  [address] [place] [address], [address] [place] [address] Avenue      Phone: [phone]  Fax: [phone]

## 2022-01-07 NOTE — TELEPHONE ENCOUNTER
This nurse called and spoke with Mr. Scottie Tatum and explained to him that Dr Kaykay Wilson stated he needs to find a PCP in [place] and then that PCP can give him the referral for the podiatrist.